# Patient Record
Sex: FEMALE | Race: WHITE | NOT HISPANIC OR LATINO | URBAN - METROPOLITAN AREA
[De-identification: names, ages, dates, MRNs, and addresses within clinical notes are randomized per-mention and may not be internally consistent; named-entity substitution may affect disease eponyms.]

---

## 2018-03-12 ENCOUNTER — INPATIENT (INPATIENT)
Facility: HOSPITAL | Age: 83
LOS: 1 days | Discharge: HOME HEALTH | End: 2018-03-14
Attending: HOSPITALIST

## 2018-03-12 VITALS
TEMPERATURE: 97 F | SYSTOLIC BLOOD PRESSURE: 190 MMHG | HEART RATE: 62 BPM | DIASTOLIC BLOOD PRESSURE: 88 MMHG | RESPIRATION RATE: 18 BRPM

## 2018-03-12 DIAGNOSIS — I10 ESSENTIAL (PRIMARY) HYPERTENSION: ICD-10-CM

## 2018-03-12 DIAGNOSIS — G30.9 ALZHEIMER'S DISEASE, UNSPECIFIED: ICD-10-CM
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
## 2018-03-12 DIAGNOSIS — F02.80 DEMENTIA IN OTHER DISEASES CLASSIFIED ELSEWHERE, UNSPECIFIED SEVERITY, WITHOUT BEHAVIORAL DISTURBANCE, PSYCHOTIC DISTURBANCE, MOOD DISTURBANCE, AND ANXIETY: ICD-10-CM

## 2018-03-12 DIAGNOSIS — E78.00 PURE HYPERCHOLESTEROLEMIA, UNSPECIFIED: ICD-10-CM

## 2018-03-12 LAB
ALBUMIN SERPL ELPH-MCNC: 3.4 G/DL — SIGNIFICANT CHANGE UP (ref 3–5.5)
ALP SERPL-CCNC: 53 U/L — SIGNIFICANT CHANGE UP (ref 30–115)
ALT FLD-CCNC: 18 U/L — SIGNIFICANT CHANGE UP (ref 0–41)
ANION GAP SERPL CALC-SCNC: 6 MMOL/L — LOW (ref 7–14)
APPEARANCE UR: (no result)
AST SERPL-CCNC: 31 U/L — SIGNIFICANT CHANGE UP (ref 0–41)
BASOPHILS # BLD AUTO: 0.02 K/UL — SIGNIFICANT CHANGE UP (ref 0–0.2)
BASOPHILS NFR BLD AUTO: 0.2 % — SIGNIFICANT CHANGE UP (ref 0–1)
BILIRUB SERPL-MCNC: 0.7 MG/DL — SIGNIFICANT CHANGE UP (ref 0.2–1.2)
BILIRUB UR-MCNC: NEGATIVE — SIGNIFICANT CHANGE UP
BUN SERPL-MCNC: 18 MG/DL — SIGNIFICANT CHANGE UP (ref 10–20)
CALCIUM SERPL-MCNC: 9.1 MG/DL — SIGNIFICANT CHANGE UP (ref 8.5–10.1)
CHLORIDE SERPL-SCNC: 106 MMOL/L — SIGNIFICANT CHANGE UP (ref 98–110)
CO2 SERPL-SCNC: 29 MMOL/L — SIGNIFICANT CHANGE UP (ref 17–32)
COLOR SPEC: YELLOW — SIGNIFICANT CHANGE UP
CREAT SERPL-MCNC: 0.9 MG/DL — SIGNIFICANT CHANGE UP (ref 0.7–1.5)
DIFF PNL FLD: (no result)
EOSINOPHIL # BLD AUTO: 0.06 K/UL — SIGNIFICANT CHANGE UP (ref 0–0.7)
EOSINOPHIL NFR BLD AUTO: 0.7 % — SIGNIFICANT CHANGE UP (ref 0–8)
GLUCOSE SERPL-MCNC: 106 MG/DL — SIGNIFICANT CHANGE UP (ref 70–110)
GLUCOSE UR QL: NEGATIVE MG/DL — SIGNIFICANT CHANGE UP
HCT VFR BLD CALC: 36 % — LOW (ref 37–47)
HGB BLD-MCNC: 12.2 G/DL — SIGNIFICANT CHANGE UP (ref 12–16)
IMM GRANULOCYTES NFR BLD AUTO: 0.4 % — HIGH (ref 0.1–0.3)
KETONES UR-MCNC: NEGATIVE — SIGNIFICANT CHANGE UP
LEUKOCYTE ESTERASE UR-ACNC: (no result)
LYMPHOCYTES # BLD AUTO: 1.26 K/UL — SIGNIFICANT CHANGE UP (ref 1.2–3.4)
LYMPHOCYTES # BLD AUTO: 14.8 % — LOW (ref 20.5–51.1)
MCHC RBC-ENTMCNC: 30.3 PG — SIGNIFICANT CHANGE UP (ref 27–31)
MCHC RBC-ENTMCNC: 33.9 G/DL — SIGNIFICANT CHANGE UP (ref 32–37)
MCV RBC AUTO: 89.6 FL — SIGNIFICANT CHANGE UP (ref 81–99)
MONOCYTES # BLD AUTO: 0.39 K/UL — SIGNIFICANT CHANGE UP (ref 0.1–0.6)
MONOCYTES NFR BLD AUTO: 4.6 % — SIGNIFICANT CHANGE UP (ref 1.7–9.3)
NEUTROPHILS # BLD AUTO: 6.74 K/UL — HIGH (ref 1.4–6.5)
NEUTROPHILS NFR BLD AUTO: 79.3 % — HIGH (ref 42.2–75.2)
NITRITE UR-MCNC: POSITIVE
PH UR: 7.5 — SIGNIFICANT CHANGE UP (ref 5–8)
PLATELET # BLD AUTO: 200 K/UL — SIGNIFICANT CHANGE UP (ref 130–400)
POTASSIUM SERPL-MCNC: 4.5 MMOL/L — SIGNIFICANT CHANGE UP (ref 3.5–5)
POTASSIUM SERPL-SCNC: 4.5 MMOL/L — SIGNIFICANT CHANGE UP (ref 3.5–5)
PROT SERPL-MCNC: 5.9 G/DL — LOW (ref 6–8)
PROT UR-MCNC: (no result) MG/DL
RBC # BLD: 4.02 M/UL — LOW (ref 4.2–5.4)
RBC # FLD: 14.2 % — SIGNIFICANT CHANGE UP (ref 11.5–14.5)
SODIUM SERPL-SCNC: 141 MMOL/L — SIGNIFICANT CHANGE UP (ref 135–146)
SP GR SPEC: 1.01 — SIGNIFICANT CHANGE UP (ref 1.01–1.03)
UROBILINOGEN FLD QL: 0.2 MG/DL — SIGNIFICANT CHANGE UP (ref 0.2–0.2)
WBC # BLD: 8.5 K/UL — SIGNIFICANT CHANGE UP (ref 4.8–10.8)
WBC # FLD AUTO: 8.5 K/UL — SIGNIFICANT CHANGE UP (ref 4.8–10.8)

## 2018-03-12 RX ORDER — ASPIRIN/CALCIUM CARB/MAGNESIUM 324 MG
81 TABLET ORAL DAILY
Qty: 0 | Refills: 0 | Status: DISCONTINUED | OUTPATIENT
Start: 2018-03-12 | End: 2018-03-14

## 2018-03-12 RX ORDER — CLOPIDOGREL BISULFATE 75 MG/1
75 TABLET, FILM COATED ORAL DAILY
Qty: 0 | Refills: 0 | Status: DISCONTINUED | OUTPATIENT
Start: 2018-03-12 | End: 2018-03-14

## 2018-03-12 RX ORDER — METOPROLOL TARTRATE 50 MG
25 TABLET ORAL
Qty: 0 | Refills: 0 | Status: DISCONTINUED | OUTPATIENT
Start: 2018-03-12 | End: 2018-03-14

## 2018-03-12 RX ORDER — FOLIC ACID 0.8 MG
1 TABLET ORAL DAILY
Qty: 0 | Refills: 0 | Status: DISCONTINUED | OUTPATIENT
Start: 2018-03-12 | End: 2018-03-14

## 2018-03-12 RX ORDER — DONEPEZIL HYDROCHLORIDE 10 MG/1
5 TABLET, FILM COATED ORAL AT BEDTIME
Qty: 0 | Refills: 0 | Status: DISCONTINUED | OUTPATIENT
Start: 2018-03-12 | End: 2018-03-14

## 2018-03-12 RX ORDER — FAMOTIDINE 10 MG/ML
20 INJECTION INTRAVENOUS DAILY
Qty: 0 | Refills: 0 | Status: DISCONTINUED | OUTPATIENT
Start: 2018-03-12 | End: 2018-03-14

## 2018-03-12 RX ORDER — SIMVASTATIN 20 MG/1
20 TABLET, FILM COATED ORAL AT BEDTIME
Qty: 0 | Refills: 0 | Status: DISCONTINUED | OUTPATIENT
Start: 2018-03-12 | End: 2018-03-14

## 2018-03-12 RX ORDER — HEPARIN SODIUM 5000 [USP'U]/ML
5000 INJECTION INTRAVENOUS; SUBCUTANEOUS EVERY 8 HOURS
Qty: 0 | Refills: 0 | Status: DISCONTINUED | OUTPATIENT
Start: 2018-03-12 | End: 2018-03-14

## 2018-03-12 RX ADMIN — Medication 81 MILLIGRAM(S): at 18:30

## 2018-03-12 RX ADMIN — CLOPIDOGREL BISULFATE 75 MILLIGRAM(S): 75 TABLET, FILM COATED ORAL at 18:30

## 2018-03-12 RX ADMIN — DONEPEZIL HYDROCHLORIDE 5 MILLIGRAM(S): 10 TABLET, FILM COATED ORAL at 21:58

## 2018-03-12 RX ADMIN — HEPARIN SODIUM 5000 UNIT(S): 5000 INJECTION INTRAVENOUS; SUBCUTANEOUS at 21:58

## 2018-03-12 RX ADMIN — SIMVASTATIN 20 MILLIGRAM(S): 20 TABLET, FILM COATED ORAL at 21:58

## 2018-03-12 RX ADMIN — Medication 1 MILLIGRAM(S): at 18:30

## 2018-03-12 RX ADMIN — FAMOTIDINE 20 MILLIGRAM(S): 10 INJECTION INTRAVENOUS at 18:30

## 2018-03-12 RX ADMIN — Medication 25 MILLIGRAM(S): at 18:30

## 2018-03-12 NOTE — ED PROVIDER NOTE - PHYSICAL EXAMINATION
Alert, NAD, WDWN, well-appearing  PERRL, EOMI, normal pupils, no icterus, normal external ENT, pink/moist membranes  Airway intact, Lungs CTAB, no wheezing or rhonchi, normal resp effort w/o tachypnea, no retractions, speaking full sentences  CVS1S2, RRR, no m/g/r, 2+ pulses b/l, warm/well-perfused  Abdomen soft, nondistended, no tenderness on palpation to all 4 quadrants, no rebound or rigidity, no CVA tenderness  FROM all 4 ext, no bony tenderness or obvious deformities, no midline neck tenderness, pelvis stable  AAOx3, CN 2-12 intact, normal motor, normal sensory, normal gait Alert, NAD, WDWN, well-appearing  PERRL, EOMI, normal pupils, no icterus, normal external ENT, pink/moist membranes  Airway intact, Lungs CTAB, no wheezing or rhonchi, normal resp effort w/o tachypnea, no retractions, speaking full sentences  CVS1S2, RRR, no m/g/r, 2+ pulses b/l, warm/well-perfused  Abdomen soft, nondistended, no tenderness on palpation to all 4 quadrants, no rebound or rigidity, no CVA tenderness  FROM all 4 ext, no bony tenderness or obvious deformities, no midline neck tenderness, pelvis stable  AAOx3, CN 2-12 intact, normal motor, normal sensory, normal gait  psych: pt is calm and appropriate

## 2018-03-12 NOTE — H&P ADULT - NSHPPHYSICALEXAM_GEN_ALL_CORE
Vital Signs Last 24 Hrs  T(C): 36.9 (12 Mar 2018 15:06), Max: 36.9 (12 Mar 2018 15:06)  T(F): 98.4 (12 Mar 2018 15:06), Max: 98.4 (12 Mar 2018 15:06)  HR: 51 (12 Mar 2018 15:06) (51 - 62)  BP: 172/81 (12 Mar 2018 15:06) (172/81 - 190/88)  RR: 18 (12 Mar 2018 15:06) (18 - 18)  SpO2: 99% (12 Mar 2018 15:06) (99% - 99%)    GENERAL APPEARANCE:  alert and cooperative AOx2, and appears to be in no acute distress.  HEENT: poor dentition.   NECK: Neck supple, non-tender without lymphadenopathy, masses or thyromegaly.  CARDIAC: RRR, Normal S1 and S2. No S3, S4 or murmurs  LUNGS: Clear to auscultation without rales, rhonchi or wheezing.  ABDOMEN: Positive bowel sounds. Soft, nondistended, nontender. No guarding or rebound. No HSM.  EXTREMITIES: No edema. Peripheral pulses intact. No varicosities.  SKIN: Skin normal color, texture and turgor with no lesions or eruptions.

## 2018-03-12 NOTE — H&P ADULT - NSHPLABSRESULTS_GEN_ALL_CORE
< from: 12 Lead ECG (03.12.18 @ 12:31) >    Ventricular Rate 58 BPM    Atrial Rate 58 BPM    P-R Interval 186 ms    QRS Duration 84 ms    Q-T Interval 448 ms    QTC Calculation(Bezet) 439 ms    P Axis 66 degrees    R Axis 54 degrees    T Axis 39 degrees    < end of copied text >  NSR  No ST seg or T wave inversion

## 2018-03-12 NOTE — ED PROVIDER NOTE - OBJECTIVE STATEMENT
82 yr old female, presenting from home with a history of a reported fall from standing, denies any head injury or LOC, states her knees hurt, lives with family but does not know who called 911, denies any chest pain, abd pain, N/V/D, HA or dizziness. Currently has no complaints. Not on blood thinners

## 2018-03-12 NOTE — H&P ADULT - ASSESSMENT
81 YO woman with a Hx of HTN, DL and Alzheimer's disease presenting for a fall from the couch to the floor. The patient does not have any injuries from the fall and there is no history of syncope.  The patient is to be admitted for monitoring and evaluation by physical medicine.

## 2018-03-12 NOTE — H&P ADULT - PROBLEM SELECTOR PLAN 1
patient may benefit from either long term placement or longer home care since she is not able to care for herself  otherwise stable

## 2018-03-12 NOTE — ED ADULT NURSE NOTE - OBJECTIVE STATEMENT
Pt presents to ED from home c/o a fall. Pt reports she tripped, fell forward onto her knees, & is c/o b/l leg pain. No bruising/abrasions noted. Pt states she cannot remember if she hit her head.

## 2018-03-12 NOTE — ED PROVIDER NOTE - PROGRESS NOTE DETAILS
ACR report states [place] lives in [place], found on the floor, fall from couch while watching TV, unknown who called 911 Spoke to granddaughter Edith Ivan [phone], states she called 911, no one able to take care of her at home, her son is her primary caretaker who is admitted in the hospital for a fall, Alexander Barrientos, [phone]. Not coming to pick her up, no one else able to pick her up. No one else at home to open the door for her. Granddaughter live in [place]. ATTENDING NOTE:   83 y/o F, very poor historian, per ACR and conversation with family, pt had slip off of couch today with no LOC, however she could not get off of floor. Son is primary caregiver but is currently admitted to hospital himself.  Exam as documented and unrevealing.  Will get labs, CXR, EKG, head CT and admit as pt has unsafe discharge.

## 2018-03-12 NOTE — ED PROVIDER NOTE - NS ED ROS FT
Review of Systems:  	•	CONSTITUTIONAL - no fever, no diaphoresis, no chills  	•	SKIN - no rash  	•	RESPIRATORY - no shortness of breath, no cough  	•	CARDIAC - no chest pain, no palpitations  	•	GI - no abd pain, no nausea, no vomiting, no diarrhea  	•	GENITO-URINARY - no discharge, no dysuria; no hematuria, no increased urinary frequency  	•	MUSCULOSKELETAL - no joint paint, no swelling, no redness  	•	NEUROLOGIC - no weakness, no headache, no paresthesias, no LOC  	•	PSYCH - non suicidal, non homicidal, no hallucination, no depression

## 2018-03-12 NOTE — H&P ADULT - ATTENDING COMMENTS
Patient was evaluated and examined by bedside, no active complains, tolerating diet well.    All labs, radiology studies, VS was reviewed  I agree with medical plan outlined by Medical resident as stated above.  will plan for PT/OT evaluation, for chronic medical conditions will resume home regimen tx.

## 2018-03-12 NOTE — H&P ADULT - HISTORY OF PRESENT ILLNESS
This is the case of a 83 YO woman brought to the ED by EMS after she was found on the floor by her granddaughter.  The patient slipped from the couch to the floor while she was sleeping, there is no clear history of loss of consciousness or head trauma and the patient was found awake by her granddaughter. According to her son the patient is not very active and usually only goes from her couch, where she sleeps most of the time to the commode. There is no change in mental status. No recent history of fevers or chills.   At baseline the patient is aox2 and confused most of the time, her primary caregiver is her son, who is currently admitted to the hospital and there is noone at home to take care of her from her family. She has a home aid for 45 hours a week.

## 2018-03-12 NOTE — H&P ADULT - PMH
Alzheimer's dementia without behavioral disturbance, unspecified timing of dementia onset    High cholesterol    Hypertension    Hypothyroid

## 2018-03-13 RX ORDER — NIFEDIPINE 30 MG
10 TABLET, EXTENDED RELEASE 24 HR ORAL ONCE
Qty: 0 | Refills: 0 | Status: COMPLETED | OUTPATIENT
Start: 2018-03-13 | End: 2018-03-13

## 2018-03-13 RX ADMIN — HEPARIN SODIUM 5000 UNIT(S): 5000 INJECTION INTRAVENOUS; SUBCUTANEOUS at 21:04

## 2018-03-13 RX ADMIN — Medication 25 MILLIGRAM(S): at 06:40

## 2018-03-13 RX ADMIN — DONEPEZIL HYDROCHLORIDE 5 MILLIGRAM(S): 10 TABLET, FILM COATED ORAL at 21:04

## 2018-03-13 RX ADMIN — Medication 1 DROP(S): at 18:44

## 2018-03-13 RX ADMIN — SIMVASTATIN 20 MILLIGRAM(S): 20 TABLET, FILM COATED ORAL at 21:04

## 2018-03-13 RX ADMIN — FAMOTIDINE 20 MILLIGRAM(S): 10 INJECTION INTRAVENOUS at 11:27

## 2018-03-13 RX ADMIN — Medication 81 MILLIGRAM(S): at 11:27

## 2018-03-13 RX ADMIN — Medication 1 DROP(S): at 21:04

## 2018-03-13 RX ADMIN — Medication 1 MILLIGRAM(S): at 11:27

## 2018-03-13 RX ADMIN — CLOPIDOGREL BISULFATE 75 MILLIGRAM(S): 75 TABLET, FILM COATED ORAL at 11:27

## 2018-03-13 RX ADMIN — HEPARIN SODIUM 5000 UNIT(S): 5000 INJECTION INTRAVENOUS; SUBCUTANEOUS at 14:45

## 2018-03-13 RX ADMIN — Medication 25 MILLIGRAM(S): at 18:45

## 2018-03-13 RX ADMIN — Medication 10 MILLIGRAM(S): at 02:11

## 2018-03-13 NOTE — DISCHARGE NOTE ADULT - HOSPITAL COURSE
81 yo F w/ PMHx of HTN, DL and Alzheimer's disease presenting for a fall from the couch to the floor. The patient does not have any injuries from the fall and there is no history of syncope. Her trauma workup was negative including CT of head. Her hospital course was unremarkable and PT was consulted. They recommended home with outpatient PT services. She was medically stable for discharge.

## 2018-03-13 NOTE — DISCHARGE NOTE ADULT - CARE PROVIDER_API CALL
Mynor Valles  [address] [facility]  Internal Medicine  Phone: [phone]  Fax: (   )    -

## 2018-03-13 NOTE — DISCHARGE NOTE ADULT - PLAN OF CARE
To ensure you do not have any further falls and your weakness resolves. Please walk with assistance at all times and continue to work with PT to work on your muscle strength. To ensure your mental status remains at baseline. Please continue to take your medications as prescribed and follow up with your PMD.

## 2018-03-13 NOTE — DISCHARGE NOTE ADULT - CARE PLAN
Principal Discharge DX:	Fall at home, initial encounter  Goal:	To ensure you do not have any further falls and your weakness resolves.  Assessment and plan of treatment:	Please walk with assistance at all times and continue to work with PT to work on your muscle strength.  Secondary Diagnosis:	Alzheimer's dementia without behavioral disturbance, unspecified timing of dementia onset  Goal:	To ensure your mental status remains at baseline.  Assessment and plan of treatment:	Please continue to take your medications as prescribed and follow up with your PMD.

## 2018-03-13 NOTE — CONSULT NOTE ADULT - ASSESSMENT

## 2018-03-13 NOTE — DISCHARGE NOTE ADULT - MEDICATION SUMMARY - MEDICATIONS TO TAKE
I will START or STAY ON the medications listed below when I get home from the hospital:    aspirin 81 mg oral tablet, chewable  -- 1 tab(s) by mouth once a day  -- Indication: For TIA    simvastatin 20 mg oral tablet  -- 1 tab(s) by mouth once a day (at bedtime)  -- Indication: For High cholesterol    Plavix 75 mg oral tablet  -- 1 tab(s) by mouth once a day  -- Indication: For TIA    Lopressor  -- 25 milligram(s) by mouth 2 times a day  -- Indication: For Hypertension    donepezil 5 mg oral tablet  -- 1 tab(s) by mouth once a day (at bedtime)  -- Indication: For Alzheimer's dementia without behavioral disturbance, unspecified timing of dementia onset    famotidine 20 mg oral tablet  -- 1 tab(s) by mouth 2 times a day  -- Indication: For GERD    folic acid 1 mg oral tablet  -- 1 tab(s) by mouth once a day  -- Indication: For Alzheimer's dementia without behavioral disturbance, unspecified timing of dementia onset

## 2018-03-13 NOTE — PROGRESS NOTE ADULT - ASSESSMENT
83 yo F w/ PMHx of HTN, DL and Alzheimer's disease presenting for a fall from the couch to the floor. The patient does not have any injuries from the fall and there is no history of syncope.  She was admitted for monitoring and evaluation by physical medicine, her problems are being managed as follows --     #Fall at home likely 2/2 muscle deconditioning and worsening Alzheimer's dementia   - PT rehab c/s  - OOB as tolerated w/ assistance  - c/w donepezil    #HTN, stable  - c/w lopressor    #CAD   - c/w ASA/plavix     #DLD  - c/w simvastatin    #DVT ppx: WYATT    #Dispo: pending PT recs for placement

## 2018-03-13 NOTE — DISCHARGE NOTE ADULT - PATIENT PORTAL LINK FT
You can access the [facility] Patient Portal, offered by [facility], by registering with the following website: [email or web address]

## 2018-03-13 NOTE — DISCHARGE NOTE ADULT - MEDICATION SUMMARY - MEDICATIONS TO STOP TAKING
Ok to take aspirin as long as she doesn't have any history of allergy or major bleeding problems.   I will STOP taking the medications listed below when I get home from the hospital:  None

## 2018-03-13 NOTE — DISCHARGE NOTE ADULT - PROVIDER TOKENS
FREE:[LAST:[Hai],FIRST:[Mynor],PHONE:[phone]],FAX:[(   [phone]    -],ADDRESS:[[address] [place] [address]  Internal Medicine]]

## 2018-03-13 NOTE — DISCHARGE NOTE ADULT - ADDITIONAL INSTRUCTIONS
If you experience any fevers, chills, chest pain, shortness of breath, falls at home please call 911 or go to the nearest ER.

## 2018-03-13 NOTE — PROGRESS NOTE ADULT - ATTENDING COMMENTS
Patient was evaluated and examined by bedside, no active complains, tolerating diet well.    All labs, radiology studies, VS was reviewed  I agree with medical plan outlined by Medical resident as stated above.  patient was evaluated by PT/OT - recommending home health services with home PT.  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  -Chronic left eye- lower eyelid eversion- outpatient opthalmology f/up  D/C PLAN- home with home health services today

## 2018-03-13 NOTE — PROGRESS NOTE ADULT - SUBJECTIVE AND OBJECTIVE BOX
JATINDER.    Patient is a 82y old  Female who presents with a chief complaint of fall (12 Mar 2018 16:38)      PAST MEDICAL & SURGICAL HISTORY:  Alzheimer's dementia without behavioral disturbance, unspecified timing of dementia onset  Hypothyroid  High cholesterol  Hypertension  No significant past surgical history      MEDICATIONS  (STANDING):  artificial  tears Solution 1 Drop(s) Left EYE three times a day  aspirin  chewable 81 milliGRAM(s) Oral daily  clopidogrel Tablet 75 milliGRAM(s) Oral daily  donepezil 5 milliGRAM(s) Oral at bedtime  famotidine    Tablet 20 milliGRAM(s) Oral daily  folic acid 1 milliGRAM(s) Oral daily  heparin  Injectable 5000 Unit(s) SubCutaneous every 8 hours  metoprolol     tartrate 25 milliGRAM(s) Oral two times a day  simvastatin 20 milliGRAM(s) Oral at bedtime      Vital Signs Last 24 Hrs  T(C): 36.1 (13 Mar 2018 05:58), Max: 37 (12 Mar 2018 19:50)  T(F): 97 (13 Mar 2018 05:58), Max: 98.6 (12 Mar 2018 19:50)  HR: 65 (13 Mar 2018 05:58) (50 - 65)  BP: 136/65 (13 Mar 2018 05:58) (126/60 - 192/84)  BP(mean): --  RR: 18 (13 Mar 2018 05:58) (18 - 18)  SpO2: 98% (13 Mar 2018 02:31) (95% - 99%)  CAPILLARY BLOOD GLUCOSE      Physical Exam:    -     General : Lying in bed in no acute distress  -      HEENT: NCAT  -      Cardiac: RRR  -      Pulm: CTA BL   -      GI: soft NTND  -      Musculoskeletal: nl ROM, no edema/cyanosis  -      Neuro: A&O x3, no focal deficits        Labs:                        12.2   8.50  )-----------( 200      ( 12 Mar 2018 11:09 )             36.0             03-12    141  |  106  |  18  ----------------------------<  106  4.5   |  29  |  0.9    Ca    9.1      12 Mar 2018 11:09    TPro  5.9<L>  /  Alb  3.4  /  TBili  0.7  /  DBili  x   /  AST  31  /  ALT  18  /  AlkPhos  53  03-12    LIVER FUNCTIONS - ( 12 Mar 2018 11:09 )  Alb: 3.4 g/dL / Pro: 5.9 g/dL / ALK PHOS: 53 U/L / ALT: 18 U/L / AST: 31 U/L / GGT: x                         Urinalysis Basic - ( 12 Mar 2018 19:05 )    Color: Yellow / Appearance: Cloudy / S.015 / pH: x  Gluc: x / Ketone: Negative  / Bili: Negative / Urobili: 0.2 mg/dL   Blood: x / Protein: Trace mg/dL / Nitrite: Positive   Leuk Esterase: Moderate / RBC: x / WBC 10-25 /HPF   Sq Epi: x / Non Sq Epi: Occasional /HPF / Bacteria: Moderate /HPF          Imaging:    ECG: JATINDER.    Patient is a 82y old  Female who presents with a chief complaint of fall (12 Mar 2018 16:38)      PAST MEDICAL & SURGICAL HISTORY:  Alzheimer's dementia without behavioral disturbance, unspecified timing of dementia onset  Hypothyroid  High cholesterol  Hypertension  No significant past surgical history      MEDICATIONS  (STANDING):  artificial  tears Solution 1 Drop(s) Left EYE three times a day  aspirin  chewable 81 milliGRAM(s) Oral daily  clopidogrel Tablet 75 milliGRAM(s) Oral daily  donepezil 5 milliGRAM(s) Oral at bedtime  famotidine    Tablet 20 milliGRAM(s) Oral daily  folic acid 1 milliGRAM(s) Oral daily  heparin  Injectable 5000 Unit(s) SubCutaneous every 8 hours  metoprolol     tartrate 25 milliGRAM(s) Oral two times a day  simvastatin 20 milliGRAM(s) Oral at bedtime      Vital Signs Last 24 Hrs  T(C): 36.1 (13 Mar 2018 05:58), Max: 37 (12 Mar 2018 19:50)  T(F): 97 (13 Mar 2018 05:58), Max: 98.6 (12 Mar 2018 19:50)  HR: 65 (13 Mar 2018 05:58) (50 - 65)  BP: 136/65 (13 Mar 2018 05:58) (126/60 - 192/84)  BP(mean): --  RR: 18 (13 Mar 2018 05:58) (18 - 18)  SpO2: 98% (13 Mar 2018 02:31) (95% - 99%)  CAPILLARY BLOOD GLUCOSE      Physical Exam:    -     General : Lying in bed in no acute distress  -      HEENT: NCAT  -      Cardiac: RRR  -      Pulm: CTA BL   -      GI: soft NTND  -      Musculoskeletal: nl ROM, no edema/cyanosis        Labs:                        12.2   8.50  )-----------( 200      ( 12 Mar 2018 11:09 )             36.0   03-12    141  |  106  |  18  ----------------------------<  106  4.5   |  29  |  0.9    Ca    9.1      12 Mar 2018 11:09    TPro  5.9<L>  /  Alb  3.4  /  TBili  0.7  /  DBili  x   /  AST  31  /  ALT  18  /  AlkPhos  53  03-12        Urinalysis Basic - ( 12 Mar 2018 19:05 )    Color: Yellow / Appearance: Cloudy / S.015 / pH: x  Gluc: x / Ketone: Negative  / Bili: Negative / Urobili: 0.2 mg/dL   Blood: x / Protein: Trace mg/dL / Nitrite: Positive   Leuk Esterase: Moderate / RBC: x / WBC 10-25 /HPF   Sq Epi: x / Non Sq Epi: Occasional /HPF / Bacteria: Moderate /HPF

## 2018-03-13 NOTE — CONSULT NOTE ADULT - SUBJECTIVE AND OBJECTIVE BOX
HPI:  This is the case of a 81 YO woman brought to the ED by EMS after she was found on the floor by her granddaughter.  The patient slipped from the couch to the floor while she was sleeping, there is no clear history of loss of consciousness or head trauma and the patient was found awake by her granddaughter. According to her son the patient is not very active and usually only goes from her couch, where she sleeps most of the time to the commode. There is no change in mental status. No recent history of fevers or chills.   At baseline the patient is aox2 and confused most of the time, her primary caregiver is her son, who is currently admitted to the hospital and there is no one at home to take care of her from her family. She has a home aid for 45 hours a week. (12 Mar 2018 16:38)      PAST MEDICAL & SURGICAL HISTORY:  Alzheimer's dementia without behavioral disturbance, unspecified timing of dementia onset  Hypothyroid  High cholesterol  Hypertension  No significant past surgical history      Hospital Course:    TODAY'S SUBJECTIVE & REVIEW OF SYMPTOMS:     Constitutional WNL   Cardio WNL   Resp WNL   GI WNL  Heme WNL  Endo WNL  Skin WNL  MSK WNL  Neuro WNL      MEDICATIONS  (STANDING):  artificial  tears Solution 1 Drop(s) Left EYE three times a day  aspirin  chewable 81 milliGRAM(s) Oral daily  clopidogrel Tablet 75 milliGRAM(s) Oral daily  donepezil 5 milliGRAM(s) Oral at bedtime  famotidine    Tablet 20 milliGRAM(s) Oral daily  folic acid 1 milliGRAM(s) Oral daily  heparin  Injectable 5000 Unit(s) SubCutaneous every 8 hours  metoprolol     tartrate 25 milliGRAM(s) Oral two times a day  simvastatin 20 milliGRAM(s) Oral at bedtime    MEDICATIONS  (PRN):      FAMILY HISTORY:  No pertinent family history in first degree relatives      Allergies    penicillin (Unknown)    Intolerances        SOCIAL HISTORY:    [  ] Etoh  [  ] Smoking  [  ] Substance abuse     Home Environment:  [  ] Home Alone  [ x ] Lives with Family  [  ] Home Health Aid    Dwelling:  [  ] Apartment  [ x ] Private House  [  ] Adult Home  [  ] Skilled Nursing Facility      [  ] Short Term  [  ] Long Term  [ x ] Stairs       Elevator [  ]    FUNCTIONAL STATUS PTA: (Check all that apply)  Ambulation: [   ]Independent    [  ] Dependent     [  ] Non-Ambulatory  Assistive Device: [  ] SA Cane  [  ]  Q Cane  [ x ] Walker  [  ]  Wheelchair  ADL : [  ] Independent  [  x]  Dependent       Vital Signs Last 24 Hrs  T(C): 36.1 (13 Mar 2018 05:58), Max: 37 (12 Mar 2018 19:50)  T(F): 97 (13 Mar 2018 05:58), Max: 98.6 (12 Mar 2018 19:50)  HR: 65 (13 Mar 2018 05:58) (50 - 65)  BP: 136/65 (13 Mar 2018 05:58) (126/60 - 192/84)  BP(mean): --  RR: 18 (13 Mar 2018 05:58) (18 - 18)  SpO2: 98% (13 Mar 2018 02:31) (95% - 99%)      PHYSICAL EXAM: awake   GENERAL: NAD, well-groomed, well-developed  HEAD:  Atraumatic, Normocephalic  EYES: EOMI, PERRLA, conjunctiva and sclera clear  NECK: Supple, No JVD, Normal thyroid  CHEST/LUNG: Clear   HEART: Regular   ABDOMEN: Soft  EXTREMITIES:  no calf tenderness    NERVOUS SYSTEM:  Cranial Nerves 2-12 intact [  ] Abnormal  [  ]  ROM: WFL all extremities [  ]  Abnormal [  ]  Motor Strength: WFL all extremities  [  ]  Abnormal [  ]  Sensation: intact to light touch [  ] Abnormal [  ]  Reflexes: Symmetric [  ]  Abnormal [  ]    FUNCTIONAL STATUS:  Bed Mobility: Independent [  ]  Supervision [  ]  Needs Assistance [x  ]  N/A [  ]  Transfers: Independent [  ]  Supervision [  ]  Needs Assistance [x  ]  N/A [  ]   Ambulation: Independent [  ]  Supervision [  ]  Needs Assistance [  ]  N/A [  ]  ADL: Independent [  ] Requires Assistance [  ] N/A [  ]      LABS:                        12.2   8.50  )-----------( 200      ( 12 Mar 2018 11:09 )             36.0     03-12    141  |  106  |  18  ----------------------------<  106  4.5   |  29  |  0.9    Ca    9.1      12 Mar 2018 11:09    TPro  5.9<L>  /  Alb  3.4  /  TBili  0.7  /  DBili  x   /  AST  31  /  ALT  18  /  AlkPhos  53  03-12      Urinalysis Basic - ( 12 Mar 2018 19:05 )    Color: Yellow / Appearance: Cloudy / S.015 / pH: x  Gluc: x / Ketone: Negative  / Bili: Negative / Urobili: 0.2 mg/dL   Blood: x / Protein: Trace mg/dL / Nitrite: Positive   Leuk Esterase: Moderate / RBC: x / WBC 10-25 /HPF   Sq Epi: x / Non Sq Epi: Occasional /HPF / Bacteria: Moderate /HPF        RADIOLOGY & ADDITIONAL STUDIES:    Assesment:

## 2018-03-14 VITALS
DIASTOLIC BLOOD PRESSURE: 70 MMHG | TEMPERATURE: 97 F | SYSTOLIC BLOOD PRESSURE: 154 MMHG | HEART RATE: 59 BPM | RESPIRATION RATE: 19 BRPM

## 2018-03-14 RX ADMIN — HEPARIN SODIUM 5000 UNIT(S): 5000 INJECTION INTRAVENOUS; SUBCUTANEOUS at 05:24

## 2018-03-14 RX ADMIN — Medication 1 DROP(S): at 14:12

## 2018-03-14 RX ADMIN — Medication 1 MILLIGRAM(S): at 11:22

## 2018-03-14 RX ADMIN — HEPARIN SODIUM 5000 UNIT(S): 5000 INJECTION INTRAVENOUS; SUBCUTANEOUS at 14:12

## 2018-03-14 RX ADMIN — Medication 81 MILLIGRAM(S): at 11:22

## 2018-03-14 RX ADMIN — Medication 25 MILLIGRAM(S): at 05:24

## 2018-03-14 RX ADMIN — FAMOTIDINE 20 MILLIGRAM(S): 10 INJECTION INTRAVENOUS at 11:22

## 2018-03-14 RX ADMIN — Medication 1 DROP(S): at 05:24

## 2018-03-14 RX ADMIN — CLOPIDOGREL BISULFATE 75 MILLIGRAM(S): 75 TABLET, FILM COATED ORAL at 11:22

## 2018-03-15 DIAGNOSIS — I10 ESSENTIAL (PRIMARY) HYPERTENSION: ICD-10-CM

## 2018-03-15 DIAGNOSIS — R26.9 UNSPECIFIED ABNORMALITIES OF GAIT AND MOBILITY: ICD-10-CM

## 2018-03-15 DIAGNOSIS — E78.5 HYPERLIPIDEMIA, UNSPECIFIED: ICD-10-CM
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
## 2018-03-15 DIAGNOSIS — F02.80 DEMENTIA IN OTHER DISEASES CLASSIFIED ELSEWHERE, UNSPECIFIED SEVERITY, WITHOUT BEHAVIORAL DISTURBANCE, PSYCHOTIC DISTURBANCE, MOOD DISTURBANCE, AND ANXIETY: ICD-10-CM
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
## 2018-03-15 DIAGNOSIS — E03.9 HYPOTHYROIDISM, UNSPECIFIED: ICD-10-CM
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
## 2018-03-15 DIAGNOSIS — Z88.0 ALLERGY STATUS TO PENICILLIN: ICD-10-CM

## 2018-03-15 DIAGNOSIS — M62.81 MUSCLE WEAKNESS (GENERALIZED): ICD-10-CM

## 2018-03-15 DIAGNOSIS — G30.9 ALZHEIMER'S DISEASE, UNSPECIFIED: ICD-10-CM

## 2020-09-30 NOTE — ED ADULT NURSE NOTE - IN THE PAST YEAR, HOW OFTEN HAVE YOU USED TOBACCO PRODUCTS?
>>prior visits  The patient is a 19 year old,  or  male , who presents on referral from ER, for a gastroenterology evaluation for diarrhea which has been watery, and he has been having about 6 bowel movements per day when the symptoms occur. A copy of this document will be sent to the referring provider. The diarrhea has been present daily since it began approximately 1 month ago. Besides diarrhea , the patient complains of abdominal pain and hematochezia. They have had hematochezia for 1 week. When there is blood, the color is bright red, and the bleeding is intermittently. He reports abdominal pain which is dull and aching in nature. It has been present for approximately 1 month. The pain is located in the lower quadrants. When the pain occurs it is mild in severity, and has a crampy quality. The pain seems to occur at random times. The pain is improved with nothing in particular. He has not traveled out of the country. The patient states no additional potential causes. Diagnostic Studies: Diagnostic Studies labwork and CT done recently at hospital PMH-NONE PSH-TESTICULAR SURGERY ALL-NKDA SH-DENIES TOBACCO ETOH OR DRUG USE FH-DENIES IBD SYMPTOMS   10/4/18: Patient returns for follow up. 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 A prescription for budesonide was sent to [facility] pharmacy, however patient's father states it was never mailed to them. Patient has been doing well. He does not have any further diarrhea or bloody stools. No abdominal pain, N/V.   FOLLOW UP 1/29/19-PATIENT PRESENTS FOR FOLLOW UP. HE RECENTLY HAD STOOL TESTNING WHICH WAS NEGATIVE FOR INFECTION OR INFLAMMATION. HE COMPLAINS OF BLOOD ON TOILET TISSUE WHEN HE WIPES. HE DOES STRAIN SOME WHEN GOING TO BATHROOM. HE HAS HAD A RECENT RECTAL EXAM.   FOLLOW UP 3/26/19-PATIENT PRESENTS FOR FOLLOW UP. HE STATES HE HAD ANOTHER EPISODE OF HEMORRHOID BLEEDING RECENTLY. HE HAS BEEN INCONSISTENTLY TAKING BENEFIBER AND DID NOT TAKE ANUSOL CREAM FROM LAST VISIT. HE STATES HE IS HAVING 2 BOWEL MOVEMENTS DAILY WHICH ARE SOFT. HE DENIES ANY NEW SYMPTOMS.   FOLLOW UP 5/21/19-PATIENT PRESENTS FOR FOLLOW UP. HE IS NOT TAKING BENEFIBER BUT DID USE ANUSOL CREAM WHICH STOPPED HIS HEMORRHOIDAL DISCOMFORT. HE DENIES CURRENT BLEEDING. 8/20/19: Patient returns with c/o recurrent rectal bleeding x 1 episode on 8/7/19. He reports a large amount of blood in his underwear. Denies any further bleeding since then. No rectal pain. He admits to straning with BMs. Stool is occasionally hard. Denies any rectal pain. Reports anal itching from hemorrhoids. Had been using Anusol-HC, but is out. He has been taking Benefiber morning and night. He has not tried any other laxatives or stool softeners. Denies any abdominal pain, N/V.   FOLLOW UP 10/29/19-PATIENT PRESENTS FOR FOLLOW UP. HE IS TAKING BENEFIBER WHICH IS HELPING HIS CONSTIPATION AND STRAINING. Never

## 2021-03-11 ENCOUNTER — INPATIENT (INPATIENT)
Facility: HOSPITAL | Age: 86
LOS: 3 days | Discharge: HOME | End: 2021-03-15
Attending: FAMILY MEDICINE | Admitting: FAMILY MEDICINE
Payer: MEDICARE

## 2021-03-11 VITALS
TEMPERATURE: 98 F | RESPIRATION RATE: 18 BRPM | SYSTOLIC BLOOD PRESSURE: 197 MMHG | DIASTOLIC BLOOD PRESSURE: 82 MMHG | OXYGEN SATURATION: 99 % | HEART RATE: 60 BPM

## 2021-03-11 PROBLEM — E03.9 HYPOTHYROIDISM, UNSPECIFIED: Chronic | Status: ACTIVE | Noted: 2018-03-12

## 2021-03-11 PROBLEM — I10 ESSENTIAL (PRIMARY) HYPERTENSION: Chronic | Status: ACTIVE | Noted: 2018-03-12

## 2021-03-11 PROBLEM — G30.9 ALZHEIMER'S DISEASE, UNSPECIFIED: Chronic | Status: ACTIVE | Noted: 2018-03-12

## 2021-03-11 PROBLEM — E78.00 PURE HYPERCHOLESTEROLEMIA, UNSPECIFIED: Chronic | Status: ACTIVE | Noted: 2018-03-12

## 2021-03-11 LAB
ALBUMIN SERPL ELPH-MCNC: 4.2 G/DL — SIGNIFICANT CHANGE UP (ref 3.5–5.2)
ALP SERPL-CCNC: 86 U/L — SIGNIFICANT CHANGE UP (ref 30–115)
ALT FLD-CCNC: 19 U/L — SIGNIFICANT CHANGE UP (ref 0–41)
ANION GAP SERPL CALC-SCNC: 7 MMOL/L — SIGNIFICANT CHANGE UP (ref 7–14)
APPEARANCE UR: CLEAR — SIGNIFICANT CHANGE UP
APTT BLD: 27.7 SEC — SIGNIFICANT CHANGE UP (ref 27–39.2)
AST SERPL-CCNC: 20 U/L — SIGNIFICANT CHANGE UP (ref 0–41)
BACTERIA # UR AUTO: ABNORMAL
BASOPHILS # BLD AUTO: 0.03 K/UL — SIGNIFICANT CHANGE UP (ref 0–0.2)
BASOPHILS NFR BLD AUTO: 0.6 % — SIGNIFICANT CHANGE UP (ref 0–1)
BILIRUB SERPL-MCNC: 0.5 MG/DL — SIGNIFICANT CHANGE UP (ref 0.2–1.2)
BILIRUB UR-MCNC: NEGATIVE — SIGNIFICANT CHANGE UP
BLD GP AB SCN SERPL QL: SIGNIFICANT CHANGE UP
BUN SERPL-MCNC: 24 MG/DL — HIGH (ref 10–20)
CALCIUM SERPL-MCNC: 9.7 MG/DL — SIGNIFICANT CHANGE UP (ref 8.5–10.1)
CHLORIDE SERPL-SCNC: 103 MMOL/L — SIGNIFICANT CHANGE UP (ref 98–110)
CO2 SERPL-SCNC: 31 MMOL/L — SIGNIFICANT CHANGE UP (ref 17–32)
COLOR SPEC: YELLOW — SIGNIFICANT CHANGE UP
CREAT SERPL-MCNC: 1 MG/DL — SIGNIFICANT CHANGE UP (ref 0.7–1.5)
DIFF PNL FLD: ABNORMAL
EOSINOPHIL # BLD AUTO: 0.1 K/UL — SIGNIFICANT CHANGE UP (ref 0–0.7)
EOSINOPHIL NFR BLD AUTO: 1.9 % — SIGNIFICANT CHANGE UP (ref 0–8)
EPI CELLS # UR: ABNORMAL /HPF
GLUCOSE SERPL-MCNC: 109 MG/DL — HIGH (ref 70–99)
GLUCOSE UR QL: NEGATIVE MG/DL — SIGNIFICANT CHANGE UP
HCT VFR BLD CALC: 40.2 % — SIGNIFICANT CHANGE UP (ref 37–47)
HGB BLD-MCNC: 13.4 G/DL — SIGNIFICANT CHANGE UP (ref 12–16)
IMM GRANULOCYTES NFR BLD AUTO: 0.6 % — HIGH (ref 0.1–0.3)
INR BLD: 0.97 RATIO — SIGNIFICANT CHANGE UP (ref 0.65–1.3)
KETONES UR-MCNC: NEGATIVE — SIGNIFICANT CHANGE UP
LACTATE SERPL-SCNC: 1 MMOL/L — SIGNIFICANT CHANGE UP (ref 0.7–2)
LEUKOCYTE ESTERASE UR-ACNC: ABNORMAL
LYMPHOCYTES # BLD AUTO: 1.44 K/UL — SIGNIFICANT CHANGE UP (ref 1.2–3.4)
LYMPHOCYTES # BLD AUTO: 28.1 % — SIGNIFICANT CHANGE UP (ref 20.5–51.1)
MCHC RBC-ENTMCNC: 29.9 PG — SIGNIFICANT CHANGE UP (ref 27–31)
MCHC RBC-ENTMCNC: 33.3 G/DL — SIGNIFICANT CHANGE UP (ref 32–37)
MCV RBC AUTO: 89.7 FL — SIGNIFICANT CHANGE UP (ref 81–99)
MONOCYTES # BLD AUTO: 0.29 K/UL — SIGNIFICANT CHANGE UP (ref 0.1–0.6)
MONOCYTES NFR BLD AUTO: 5.7 % — SIGNIFICANT CHANGE UP (ref 1.7–9.3)
NEUTROPHILS # BLD AUTO: 3.24 K/UL — SIGNIFICANT CHANGE UP (ref 1.4–6.5)
NEUTROPHILS NFR BLD AUTO: 63.1 % — SIGNIFICANT CHANGE UP (ref 42.2–75.2)
NITRITE UR-MCNC: NEGATIVE — SIGNIFICANT CHANGE UP
NRBC # BLD: 0 /100 WBCS — SIGNIFICANT CHANGE UP (ref 0–0)
NT-PROBNP SERPL-SCNC: 712 PG/ML — HIGH (ref 0–300)
PH UR: 7.5 — SIGNIFICANT CHANGE UP (ref 5–8)
PLATELET # BLD AUTO: 204 K/UL — SIGNIFICANT CHANGE UP (ref 130–400)
POTASSIUM SERPL-MCNC: 4.4 MMOL/L — SIGNIFICANT CHANGE UP (ref 3.5–5)
POTASSIUM SERPL-SCNC: 4.4 MMOL/L — SIGNIFICANT CHANGE UP (ref 3.5–5)
PROT SERPL-MCNC: 7 G/DL — SIGNIFICANT CHANGE UP (ref 6–8)
PROT UR-MCNC: NEGATIVE MG/DL — SIGNIFICANT CHANGE UP
PROTHROM AB SERPL-ACNC: 11.1 SEC — SIGNIFICANT CHANGE UP (ref 9.95–12.87)
RAPID RVP RESULT: SIGNIFICANT CHANGE UP
RBC # BLD: 4.48 M/UL — SIGNIFICANT CHANGE UP (ref 4.2–5.4)
RBC # FLD: 14.3 % — SIGNIFICANT CHANGE UP (ref 11.5–14.5)
RBC CASTS # UR COMP ASSIST: SIGNIFICANT CHANGE UP /HPF
SARS-COV-2 RNA SPEC QL NAA+PROBE: SIGNIFICANT CHANGE UP
SODIUM SERPL-SCNC: 141 MMOL/L — SIGNIFICANT CHANGE UP (ref 135–146)
SP GR SPEC: 1.02 — SIGNIFICANT CHANGE UP (ref 1.01–1.03)
TROPONIN T SERPL-MCNC: <0.01 NG/ML — SIGNIFICANT CHANGE UP
UROBILINOGEN FLD QL: 0.2 MG/DL — SIGNIFICANT CHANGE UP (ref 0.2–0.2)
WBC # BLD: 5.13 K/UL — SIGNIFICANT CHANGE UP (ref 4.8–10.8)
WBC # FLD AUTO: 5.13 K/UL — SIGNIFICANT CHANGE UP (ref 4.8–10.8)
WBC UR QL: ABNORMAL /HPF

## 2021-03-11 PROCEDURE — 99223 1ST HOSP IP/OBS HIGH 75: CPT

## 2021-03-11 PROCEDURE — 99285 EMERGENCY DEPT VISIT HI MDM: CPT

## 2021-03-11 PROCEDURE — 99233 SBSQ HOSP IP/OBS HIGH 50: CPT

## 2021-03-11 PROCEDURE — 74174 CTA ABD&PLVS W/CONTRAST: CPT | Mod: 26

## 2021-03-11 PROCEDURE — 71045 X-RAY EXAM CHEST 1 VIEW: CPT | Mod: 26

## 2021-03-11 RX ORDER — METOPROLOL TARTRATE 50 MG
25 TABLET ORAL
Qty: 0 | Refills: 0 | DISCHARGE

## 2021-03-11 RX ORDER — SODIUM CHLORIDE 9 MG/ML
3 INJECTION INTRAMUSCULAR; INTRAVENOUS; SUBCUTANEOUS EVERY 8 HOURS
Refills: 0 | Status: DISCONTINUED | OUTPATIENT
Start: 2021-03-11 | End: 2021-03-15

## 2021-03-11 RX ORDER — AZITHROMYCIN 500 MG/1
0 TABLET, FILM COATED ORAL
Qty: 0 | Refills: 0 | DISCHARGE

## 2021-03-11 RX ORDER — PANTOPRAZOLE SODIUM 20 MG/1
40 TABLET, DELAYED RELEASE ORAL
Refills: 0 | Status: DISCONTINUED | OUTPATIENT
Start: 2021-03-11 | End: 2021-03-15

## 2021-03-11 RX ORDER — CHLORHEXIDINE GLUCONATE 213 G/1000ML
1 SOLUTION TOPICAL
Refills: 0 | Status: DISCONTINUED | OUTPATIENT
Start: 2021-03-11 | End: 2021-03-15

## 2021-03-11 RX ORDER — SIMVASTATIN 20 MG/1
1 TABLET, FILM COATED ORAL
Qty: 0 | Refills: 0 | DISCHARGE

## 2021-03-11 RX ORDER — DONEPEZIL HYDROCHLORIDE 10 MG/1
5 TABLET, FILM COATED ORAL AT BEDTIME
Refills: 0 | Status: DISCONTINUED | OUTPATIENT
Start: 2021-03-11 | End: 2021-03-15

## 2021-03-11 RX ORDER — CEFTRIAXONE 500 MG/1
1000 INJECTION, POWDER, FOR SOLUTION INTRAMUSCULAR; INTRAVENOUS EVERY 24 HOURS
Refills: 0 | Status: COMPLETED | OUTPATIENT
Start: 2021-03-11 | End: 2021-03-14

## 2021-03-11 RX ORDER — CEFTRIAXONE 500 MG/1
1000 INJECTION, POWDER, FOR SOLUTION INTRAMUSCULAR; INTRAVENOUS ONCE
Refills: 0 | Status: COMPLETED | OUTPATIENT
Start: 2021-03-11 | End: 2021-03-11

## 2021-03-11 RX ORDER — SIMVASTATIN 20 MG/1
20 TABLET, FILM COATED ORAL AT BEDTIME
Refills: 0 | Status: DISCONTINUED | OUTPATIENT
Start: 2021-03-11 | End: 2021-03-15

## 2021-03-11 RX ORDER — ONDANSETRON 8 MG/1
4 TABLET, FILM COATED ORAL EVERY 8 HOURS
Refills: 0 | Status: DISCONTINUED | OUTPATIENT
Start: 2021-03-11 | End: 2021-03-15

## 2021-03-11 RX ORDER — FOLIC ACID 0.8 MG
1 TABLET ORAL
Qty: 0 | Refills: 0 | DISCHARGE

## 2021-03-11 RX ORDER — FUROSEMIDE 40 MG
0 TABLET ORAL
Qty: 0 | Refills: 0 | DISCHARGE

## 2021-03-11 RX ORDER — DONEPEZIL HYDROCHLORIDE 10 MG/1
1 TABLET, FILM COATED ORAL
Qty: 0 | Refills: 0 | DISCHARGE

## 2021-03-11 RX ORDER — PANTOPRAZOLE SODIUM 20 MG/1
8 TABLET, DELAYED RELEASE ORAL
Qty: 80 | Refills: 0 | Status: DISCONTINUED | OUTPATIENT
Start: 2021-03-11 | End: 2021-03-11

## 2021-03-11 RX ORDER — SODIUM CHLORIDE 9 MG/ML
1000 INJECTION, SOLUTION INTRAVENOUS ONCE
Refills: 0 | Status: COMPLETED | OUTPATIENT
Start: 2021-03-11 | End: 2021-03-11

## 2021-03-11 RX ORDER — CLOPIDOGREL BISULFATE 75 MG/1
1 TABLET, FILM COATED ORAL
Qty: 0 | Refills: 0 | DISCHARGE

## 2021-03-11 RX ORDER — PANTOPRAZOLE SODIUM 20 MG/1
80 TABLET, DELAYED RELEASE ORAL ONCE
Refills: 0 | Status: COMPLETED | OUTPATIENT
Start: 2021-03-11 | End: 2021-03-11

## 2021-03-11 RX ORDER — HYDROCORTISONE 20 MG
0 TABLET ORAL
Qty: 0 | Refills: 0 | DISCHARGE

## 2021-03-11 RX ORDER — METOPROLOL TARTRATE 50 MG
25 TABLET ORAL DAILY
Refills: 0 | Status: DISCONTINUED | OUTPATIENT
Start: 2021-03-11 | End: 2021-03-15

## 2021-03-11 RX ORDER — ACETAMINOPHEN 500 MG
650 TABLET ORAL EVERY 6 HOURS
Refills: 0 | Status: DISCONTINUED | OUTPATIENT
Start: 2021-03-11 | End: 2021-03-15

## 2021-03-11 RX ORDER — ASPIRIN/CALCIUM CARB/MAGNESIUM 324 MG
1 TABLET ORAL
Qty: 0 | Refills: 0 | DISCHARGE

## 2021-03-11 RX ORDER — FOLIC ACID 0.8 MG
1 TABLET ORAL DAILY
Refills: 0 | Status: DISCONTINUED | OUTPATIENT
Start: 2021-03-11 | End: 2021-03-15

## 2021-03-11 RX ADMIN — PANTOPRAZOLE SODIUM 10 MG/HR: 20 TABLET, DELAYED RELEASE ORAL at 12:13

## 2021-03-11 RX ADMIN — PANTOPRAZOLE SODIUM 80 MILLIGRAM(S): 20 TABLET, DELAYED RELEASE ORAL at 11:58

## 2021-03-11 RX ADMIN — SIMVASTATIN 20 MILLIGRAM(S): 20 TABLET, FILM COATED ORAL at 22:42

## 2021-03-11 RX ADMIN — CEFTRIAXONE 100 MILLIGRAM(S): 500 INJECTION, POWDER, FOR SOLUTION INTRAMUSCULAR; INTRAVENOUS at 14:39

## 2021-03-11 RX ADMIN — SODIUM CHLORIDE 1000 MILLILITER(S): 9 INJECTION, SOLUTION INTRAVENOUS at 10:27

## 2021-03-11 RX ADMIN — PANTOPRAZOLE SODIUM 40 MILLIGRAM(S): 20 TABLET, DELAYED RELEASE ORAL at 17:35

## 2021-03-11 RX ADMIN — SODIUM CHLORIDE 3 MILLILITER(S): 9 INJECTION INTRAMUSCULAR; INTRAVENOUS; SUBCUTANEOUS at 22:40

## 2021-03-11 RX ADMIN — DONEPEZIL HYDROCHLORIDE 5 MILLIGRAM(S): 10 TABLET, FILM COATED ORAL at 22:42

## 2021-03-11 NOTE — H&P ADULT - HISTORY OF PRESENT ILLNESS
84 y/o F PMHx dementia, hypothyroidism, HTN, HLD, TIA in 2000 on ASA Plavix presents to ED with rectal bleeding. Patient unable to provide history, son provided history.    No GI cancers in first or second degree relatives  No smoking. No alcohol. No illegal drug use. 84 y/o F PMHx dementia,  HTN, HLD, TIA in 2000 on ASA Plavix presents to ED with rectal bleeding. Patient unable to provide history, son provided history.    As per son , pt had 6 BM yesterday then last episode had Bright Red blood in stool.  last colonoscopy , more than 10 years ago     PMD: Dr. Cook in [place], [place]     No GI cancers in first or second degree relatives  No smoking. No alcohol. No illegal drug use.    er also treated for UTI based on UA

## 2021-03-11 NOTE — H&P ADULT - NSHPPHYSICALEXAM_GEN_ALL_CORE
T(F): 98 (03-11-21 @ 08:55), Max: 98 (03-11-21 @ 08:55)  HR: 71 (03-11-21 @ 12:08) (60 - 71)  BP: 183/81 (03-11-21 @ 12:08) (183/81 - 197/82)  RR: 18 (03-11-21 @ 12:08) (18 - 18)  SpO2: 97% (03-11-21 @ 12:08) (97% - 99%)    PHYSICAL EXAM:  GENERAL: does not respond appropriately to prompts   HEAD:  Atraumatic, Normocephalic  EYES: conjunctiva and sclera clear  NECK: Supple, No thyromegaly   CHEST/LUNG: Clear to auscultation bilaterally; No wheeze, rhonchi, or rales  HEART: Regular rate and rhythm; normal S1, S2, No murmurs.  ABDOMEN: Soft, nontender, nondistended; Bowel sounds present  NEUROLOGY: No asterixis or tremor  SKIN: Intact, no jaundice  Rectal exam-trace red blood in rectal vault T(F): 98 (03-11-21 @ 08:55), Max: 98 (03-11-21 @ 08:55)  HR: 71 (03-11-21 @ 12:08) (60 - 71)  BP: 183/81 (03-11-21 @ 12:08) (183/81 - 197/82)  RR: 18 (03-11-21 @ 12:08) (18 - 18)  SpO2: 97% (03-11-21 @ 12:08) (97% - 99%)    PHYSICAL EXAM:  GENERAL: does not respond appropriately to prompts   HEAD:  Atraumatic, Normocephalic  EYES: conjunctiva and sclera clear  NECK: Supple, No thyromegaly   CHEST/LUNG: Clear to auscultation bilaterally; No wheeze, rhonchi, or rales  HEART: Regular rate and rhythm; normal S1, S2, No murmurs.  ABDOMEN: Soft, nontender, nondistended; Bowel sounds present  NEUROLOGY: No asterixis or tremor  SKIN: Intact, no jaundice  Rectal exam-trace red blood in rectal vault- done by ER staff

## 2021-03-11 NOTE — ED PROVIDER NOTE - PHYSICAL EXAMINATION
CONSTITUTIONAL: Well-developed; well-nourished; in no acute distress.   SKIN: warm, dry  HEAD: Normocephalic; atraumatic.  EYES: no conjunctival injection. EOMI.   ENT: No nasal discharge; airway clear.  NECK: Supple; non tender.  CARD: S1, S2 normal; Regular rate and rhythm.   RESP: No wheezes, rales or rhonchi.    ABD: soft ntnd. No CVA TTP.   EXT: Normal ROM.  No LE edema.   LYMPH: No acute cervical adenopathy.  NEURO: AAO X1. No FND.   PSYCH: Cooperative, appropriate. CONSTITUTIONAL: Well-developed; well-nourished; in no acute distress.   SKIN: warm, dry  HEAD: Normocephalic; atraumatic.  EYES: no conjunctival injection. EOMI.   ENT: No nasal discharge; airway clear. Dry MM.   NECK: Supple; non tender.  CARD: S1, S2 normal; Regular rate and rhythm.   RESP: No wheezes, rales or rhonchi.    ABD: soft ntnd. No CVA TTP.   EXT: Normal ROM.  No LE edema.   LYMPH: No acute cervical adenopathy.  NEURO: AAO X1. No FND.   PSYCH: Cooperative, appropriate.

## 2021-03-11 NOTE — H&P ADULT - ASSESSMENT
85yFemale pmh HTN, high cholesterol, alzheimer's presents, TIA on aspirin and plavix presents for 5 episodes of rectal bleeding bright red blood since yesterday    Problem 1-Rectal bleeding  ddx hemorrhoidal, diverticular, malignancy   patient has never had an EGD/Colonoscopy before   Rec  -CT angio if positive IR consult for embolization if negative colonoscopy in 5 days if okay to hold plavix after neuro evaluation   -Clear liquids no red dye  -Neurology consult to see if plavix can be held  -Maintain Hemodynamic Stability   -Monitor CBC  -CMP,Optimize Electrolytes  -PT,PTT,INR  -EKG, Chest-Xray   -Transfuse prn to hgb >8  -Two large bore IV lines  - PPI BID  -Monitor Vital Signs  -Monitor Stool For blood, frequency, consistency, melena  -Active Type and Screen 85yFemale pmh HTN, high cholesterol, alzheimer's presents, TIA on aspirin and plavix presents for episodes of rectal bleeding bright red blood since yesterday    # Rectal bleeding poss due to  hemorrhoidal, diverticular,  or malignancy   - colonoscopy in 5 days if okay to hold plavix as per GI  -Clear liquids no red dye  -Maintain Hemodynamic Stability   -Monitor CBC  -Two large bore IV lines  -PPI BID  -Monitor Vital Signs  -Monitor Stool For blood, frequency, consistency, melena  -Active Type and Screen    # UTI,   on CT ascending UTI, no fever or increase wbc , UA neg nit   - will d/c cadet   - send for urine cx   - c/w Rocephin for now     # HTN   - c/w metoprolol 25 mg qd  - low NA diet     # H/O TIA ?2000  - on asa and plavix , will hold both as no indication is seen     # HLD  - c/w statin     # alzheimer  - c/w home meds       hold VTE meds , will place b/l seq    d/w Dr. Sibley

## 2021-03-11 NOTE — ED PROVIDER NOTE - OBJECTIVE STATEMENT
84 y/o F PMHx dementia, hypothyroidism, HTN, HLD, TIA in 2000 on ASA Plavix presents to ED with rectal bleeding. Patient unable to provide history, repeatedly stating "I had a stroke." 84 y/o F PMHx dementia, hypothyroidism, HTN, HLD, TIA in 2000 on ASA Plavix presents to ED with rectal bleeding. Patient unable to provide history, son provided history.

## 2021-03-11 NOTE — PHARMACOTHERAPY INTERVENTION NOTE - NSPHARMCOMMMEDSAFE
Allergy reviewed/ verified - Prescriber aware; Therapy continued
Allergy reviewed/ verified - Prescriber aware; Therapy continued

## 2021-03-11 NOTE — ED PROVIDER NOTE - CARE PLAN
Assessment and plan of treatment:	Plan: EKG, CXR, labs, urine, imaging, Protonix bolus and drop, reassess.   Principal Discharge DX:	UTI (urinary tract infection)  Assessment and plan of treatment:	Plan: EKG, CXR, labs, urine, imaging, Protonix bolus and drop, reassess.  Secondary Diagnosis:	BRBPR (bright red blood per rectum)  Secondary Diagnosis:	Abdominal pain

## 2021-03-11 NOTE — CONSULT NOTE ADULT - ASSESSMENT
85yFemale pmh HTN, high cholesterol, alzheimer's presents, TIA on aspirin and plavix presents for 5 episodes of rectal bleeding bright red blood since yesterday    Problem 1-Rectal bleeding  ddx hemorrhoidal, diverticular, malignancy   patient has never had an EGD/Colonoscopy before   Rec  -CT angio if positive IR consult for embolization if negative colonoscopy in 5 days if okay to hold plavix after neuro evaluation   -Clear liquids no red dye  -Neurology consult to see if plavix can be held  -Maintain Hemodynamic Stability   -Monitor CBC  -CMP,Optimize Electrolytes  -PT,PTT,INR  -EKG, Chest-Xray   -Transfuse prn to hgb >8  -Two large bore IV lines  - PPI BID  -Monitor Vital Signs  -Monitor Stool For blood, frequency, consistency, melena  -Active Type and Screen 85yFemale pmh HTN, high cholesterol, alzheimer's presents, TIA, CAD with stents 2018 on aspirin and plavix presents for 5 episodes of rectal bleeding bright red blood since yesterday    Problem 1-Rectal bleeding  ddx hemorrhoidal, diverticular, malignancy   patient has never had an EGD/Colonoscopy before   Rec  -CT angio negative   - colonoscopy in 4-5 days if okay to hold plavix after cardio evaluation   -Please obtain ECHO  -Clear liquids no red dye  -Neurology consult to see if plavix can be held  -Maintain Hemodynamic Stability   -Monitor CBC  -CMP,Optimize Electrolytes  -PT,PTT,INR  -EKG, Chest-Xray   -Transfuse prn to hgb >8  -Two large bore IV lines  - PPI BID  -Monitor Vital Signs  -Monitor Stool For blood, frequency, consistency, melena  -Active Type and Screen    Intrahepatic and extrahepatic biliary dilation, with CBD distended up to 1.1 cm; correlation with LFTs recommended.   Rec  -LFTs normal, patient without pain  -outpatient MRCP unless LFTs rise    85yFemale pmh HTN, high cholesterol, alzheimer's presents, TIA on aspirin and plavix presents for 5 episodes of rectal bleeding bright red blood since yesterday    Problem 1-Rectal bleeding  ddx hemorrhoidal, diverticular, malignancy   patient has never had an EGD/Colonoscopy before   Rec  -CT angio negative   - colonoscopy in 4-5 days if okay to hold plavix, okay from neuro standpoint to hold plavix will plan colonoscopy accordingly   -Colonoscopy to be done earlier if active GI bleeding or hemodynamic instability   -Please obtain ECHO  -regular diet as tolerated for now   -Maintain Hemodynamic Stability   -Monitor CBC  -CMP,Optimize Electrolytes  -PT,PTT,INR  -EKG, Chest-Xray   -Transfuse prn to hgb >8  -Two large bore IV lines  - PPI BID  -Monitor Vital Signs  -Monitor Stool For blood, frequency, consistency, melena  -Active Type and Screen    Problem 2-Intrahepatic and extrahepatic biliary dilation, with CBD distended up to 1.1 cm; correlation with LFTs recommended.   Rec  -LFTs normal, patient without pain  -outpatient MRCP unless LFTs rise     Problem 3- Findings consistent with urinary bladder cystitis. Mild left hydronephrosis with increased urothelial enhancement of left renal pelvis without calculus. Correlate for ascending urinary tract infection, left pyelonephritis.  Rec  - Care as per primary team    85yFemale pmh HTN, high cholesterol, alzheimer's presents, TIA on aspirin and plavix presents for 5 episodes of rectal bleeding bright red blood since yesterday    Problem 1-Rectal bleeding  ddx hemorrhoidal, diverticular, malignancy   patient has never had an EGD/Colonoscopy before   Rec  -CT angio negative   - colonoscopy in 4-5 days if okay to hold plavix, okay from neuro standpoint to hold plavix will plan colonoscopy accordingly   -Can continue with aspirin just hold plavix  -Colonoscopy to be done earlier if active GI bleeding or hemodynamic instability   -Please obtain ECHO  -regular diet as tolerated for now   -Maintain Hemodynamic Stability   -Monitor CBC  -CMP,Optimize Electrolytes  -PT,PTT,INR  -EKG, Chest-Xray   -Transfuse prn to hgb >8  -Two large bore IV lines  - PPI BID  -Monitor Vital Signs  -Monitor Stool For blood, frequency, consistency, melena  -Active Type and Screen    Problem 2-Intrahepatic and extrahepatic biliary dilation, with CBD distended up to 1.1 cm; correlation with LFTs recommended.   Rec  -LFTs normal, patient without pain  -outpatient MRCP unless LFTs rise     Problem 3- Findings consistent with urinary bladder cystitis. Mild left hydronephrosis with increased urothelial enhancement of left renal pelvis without calculus. Correlate for ascending urinary tract infection, left pyelonephritis.  Rec  - Care as per primary team

## 2021-03-11 NOTE — PHARMACOTHERAPY INTERVENTION NOTE - COMMENTS
As per Dr. Stubbs: MD spoke to family, pt is not allergic to PCN -The allergy is false. MD was asked to remove PCN allergy.

## 2021-03-11 NOTE — ED ADULT NURSE NOTE - NSIMPLEMENTINTERV_GEN_ALL_ED
Implemented All Fall with Harm Risk Interventions:  [place] to call system. Call bell, personal items and telephone within reach. Instruct patient to call for assistance. Room bathroom lighting operational. Non-slip footwear when patient is off stretcher. Physically safe environment: no spills, clutter or unnecessary equipment. Stretcher in lowest position, wheels locked, appropriate side rails in place. Provide visual cue, wrist band, yellow gown, etc. Monitor gait and stability. Monitor for mental status changes and reorient to person, place, and time. Review medications for side effects contributing to fall risk. Reinforce activity limits and safety measures with patient and family. Provide visual clues: red socks.

## 2021-03-11 NOTE — CONSULT NOTE ADULT - SUBJECTIVE AND OBJECTIVE BOX
Chief complaint/Reason for consult: rectal bleeding    HPI: 85yFemale pmh HTN, high cholesterol, alzheimer's presents, TIA on aspirin and plavix presents for 5 episodes of rectal bleeding bright red blood since yesterday. History from medical chart as patient unable to give history.      PAST MEDICAL & SURGICAL HISTORY:   Alzheimer&#x27;s dementia without behavioral disturbance, unspecified timing of dementia onset    Hypothyroid    High cholesterol    Hypertension    No significant past surgical history          Family history:  FAMILY HISTORY:  No pertinent family history in first degree relatives      No GI cancers in first or second degree relatives    Social History: No smoking. No alcohol. No illegal drug use.    Allergies:  penicillin (Unknown)        MEDICATIONS: Home Medications:  aspirin 81 mg oral tablet, chewable: 1 tab(s) orally once a day (11 Mar 2021 09:04)  azithromycin:  (11 Mar 2021 09:04)  donepezil 5 mg oral tablet: 1 tab(s) orally once a day (at bedtime) (11 Mar 2021 09:04)  famotidine 20 mg oral tablet: 1 tab(s) orally 2 times a day (11 Mar 2021 09:04)  folic acid 1 mg oral tablet: 1 tab(s) orally once a day (11 Mar 2021 09:04)  furosemide:  (11 Mar 2021 09:04)  hydrocortisone:  (11 Mar 2021 09:04)  Lopressor: 25 milligram(s) orally 2 times a day (11 Mar 2021 09:04)  Plavix 75 mg oral tablet: 1 tab(s) orally once a day (11 Mar 2021 09:04)  simvastatin 20 mg oral tablet: 1 tab(s) orally once a day (at bedtime) (11 Mar 2021 09:04)    MEDICATIONS  (STANDING):  pantoprazole Infusion 8 mG/Hr (10 mL/Hr) IV Continuous <Continuous>        REVIEW OF SYSTEMS  unobtainable    VITALS:   T(F): 98 (03-11-21 @ 08:55), Max: 98 (03-11-21 @ 08:55)  HR: 71 (03-11-21 @ 12:08) (60 - 71)  BP: 183/81 (03-11-21 @ 12:08) (183/81 - 197/82)  RR: 18 (03-11-21 @ 12:08) (18 - 18)  SpO2: 97% (03-11-21 @ 12:08) (97% - 99%)    PHYSICAL EXAM:  GENERAL: does not respond appropriately to prompts   HEAD:  Atraumatic, Normocephalic  EYES: conjunctiva and sclera clear  NECK: Supple, No thyromegaly   CHEST/LUNG: Clear to auscultation bilaterally; No wheeze, rhonchi, or rales  HEART: Regular rate and rhythm; normal S1, S2, No murmurs.  ABDOMEN: Soft, nontender, nondistended; Bowel sounds present  NEUROLOGY: No asterixis or tremor  SKIN: Intact, no jaundice  Rectal exam-trace red blood in rectal vault        LABS:  03-11    141  |  103  |  24<H>  ----------------------------<  109<H>  4.4   |  31  |  1.0    Ca    9.7      11 Mar 2021 09:29    TPro  7.0  /  Alb  4.2  /  TBili  0.5  /  DBili  x   /  AST  20  /  ALT  19  /  AlkPhos  86  03-11                          13.4   5.13  )-----------( 204      ( 11 Mar 2021 09:29 )             40.2     LIVER FUNCTIONS - ( 11 Mar 2021 09:29 )  Alb: 4.2 g/dL / Pro: 7.0 g/dL / ALK PHOS: 86 U/L / ALT: 19 U/L / AST: 20 U/L / GGT: x           PT/INR - ( 11 Mar 2021 09:29 )   PT: 11.10 sec;   INR: 0.97 ratio         PTT - ( 11 Mar 2021 09:29 )  PTT:27.7 sec    IMAGING:         Chief complaint/Reason for consult: rectal bleeding    HPI: 85yFemale pmh HTN, high cholesterol, alzheimer's presents, TIA, CAD with stents 2018 on  aspirin and plavix presents for 5 episodes of rectal bleeding bright red blood since yesterday. History from medical chart as patient unable to give history.      PAST MEDICAL & SURGICAL HISTORY:   Alzheimer&#x27;s dementia without behavioral disturbance, unspecified timing of dementia onset    Hypothyroid    High cholesterol    Hypertension    No significant past surgical history          Family history:  FAMILY HISTORY:  No pertinent family history in first degree relatives      No GI cancers in first or second degree relatives    Social History: No smoking. No alcohol. No illegal drug use.    Allergies:  penicillin (Unknown)        MEDICATIONS: Home Medications:  aspirin 81 mg oral tablet, chewable: 1 tab(s) orally once a day (11 Mar 2021 09:04)  azithromycin:  (11 Mar 2021 09:04)  donepezil 5 mg oral tablet: 1 tab(s) orally once a day (at bedtime) (11 Mar 2021 09:04)  famotidine 20 mg oral tablet: 1 tab(s) orally 2 times a day (11 Mar 2021 09:04)  folic acid 1 mg oral tablet: 1 tab(s) orally once a day (11 Mar 2021 09:04)  furosemide:  (11 Mar 2021 09:04)  hydrocortisone:  (11 Mar 2021 09:04)  Lopressor: 25 milligram(s) orally 2 times a day (11 Mar 2021 09:04)  Plavix 75 mg oral tablet: 1 tab(s) orally once a day (11 Mar 2021 09:04)  simvastatin 20 mg oral tablet: 1 tab(s) orally once a day (at bedtime) (11 Mar 2021 09:04)    MEDICATIONS  (STANDING):  pantoprazole Infusion 8 mG/Hr (10 mL/Hr) IV Continuous <Continuous>        REVIEW OF SYSTEMS  unobtainable    VITALS:   T(F): 98 (03-11-21 @ 08:55), Max: 98 (03-11-21 @ 08:55)  HR: 71 (03-11-21 @ 12:08) (60 - 71)  BP: 183/81 (03-11-21 @ 12:08) (183/81 - 197/82)  RR: 18 (03-11-21 @ 12:08) (18 - 18)  SpO2: 97% (03-11-21 @ 12:08) (97% - 99%)    PHYSICAL EXAM:  GENERAL: does not respond appropriately to prompts   HEAD:  Atraumatic, Normocephalic  EYES: conjunctiva and sclera clear  NECK: Supple, No thyromegaly   CHEST/LUNG: Clear to auscultation bilaterally; No wheeze, rhonchi, or rales  HEART: Regular rate and rhythm; normal S1, S2, No murmurs.  ABDOMEN: Soft, nontender, nondistended; Bowel sounds present  NEUROLOGY: No asterixis or tremor  SKIN: Intact, no jaundice  Rectal exam-trace red blood in rectal vault        LABS:  03-11    141  |  103  |  24<H>  ----------------------------<  109<H>  4.4   |  31  |  1.0    Ca    9.7      11 Mar 2021 09:29    TPro  7.0  /  Alb  4.2  /  TBili  0.5  /  DBili  x   /  AST  20  /  ALT  19  /  AlkPhos  86  03-11                          13.4   5.13  )-----------( 204      ( 11 Mar 2021 09:29 )             40.2     LIVER FUNCTIONS - ( 11 Mar 2021 09:29 )  Alb: 4.2 g/dL / Pro: 7.0 g/dL / ALK PHOS: 86 U/L / ALT: 19 U/L / AST: 20 U/L / GGT: x           PT/INR - ( 11 Mar 2021 09:29 )   PT: 11.10 sec;   INR: 0.97 ratio         PTT - ( 11 Mar 2021 09:29 )  PTT:27.7 sec    < from: Xray Chest 1 View- PORTABLE-Urgent (Xray Chest 1 View- PORTABLE-Urgent .) (03.11.21 @ 09:46) >  IMAGING:        < from: CT Angio Abdomen and Pelvis w/ IV Cont (03.11.21 @ 13:22) >  EXAM:  CT ANGIO ABD PELV (W)AW IC            PROCEDURE DATE:  03/11/2021            INTERPRETATION:  CTA abdomen and pelvis with and without IV contrast    CLINICAL HISTORY/REASON FOR EXAM: Rectal bleeding. Gastrointestinal bleeding..    TECHNIQUE:CTA abdomen and pelvis with and without IV contrast (gastrointestinal bleeding protocol). Contiguous axial CTA images of the abdomen and pelvis were obtained before and after the administration of 100 cc Optiray 350 intravenous contrast. Arterial andvenous phase images obtained. 0 cc contrast discarded. Oral contrast was not administered. Reformatted images in the coronal and sagittal planes were acquired. 3-D/MIP images obtained.    COMPARISON: None.      FINDINGS:    LOWER CHEST: Left basilar subsegmental atelectasis.    HEPATOBILIARY: Intrahepatic and extrahepatic biliary dilation, with CBD distended up to 1.1 cm. liver unremarkable.    SPLEEN: Unremarkable.    PANCREAS: Unremarkable.    ADRENAL GLANDS: Unremarkable.    KIDNEYS: Mild lefthydronephrosis with increased urothelial enhancement of left renal pelvis. Right kidney unremarkable.    ABDOMINOPELVIC NODES: No lymphadenopathy.    PELVIC ORGANS: Urinary bladder partially sent with Valadez catheter. Abnormal urinary bladder mucosal hyperenhancement, pericystic inflammatory stranding, and wall thickening up to 0.8 cm.    PERITONEUM/MESENTERY/BOWEL: No evidence of gastrointestinal arterial extravasation or venous pooling to suggest active intestinal bleed. No bowel obstruction. Noascites or pneumoperitoneum. Normal appendix.    BONES/SOFT TISSUES: Chronic left inferior pubic ramus fracture. Osteopenia. Degenerative changes of spine.    OTHER: Vascular calcifications.      IMPRESSION:    1. No evidence of active intestinal bleed    2. Findings consistent with urinary bladder cystitis. Mild left hydronephrosis with increased urothelial enhancement of left renal pelvis without calculus. Correlate for ascending urinary tract infection, left pyelonephritis.    3. Intrahepatic and extrahepatic biliary dilation, with CBD distended up to 1.1 cm; correlation with LFTs recommended. Consider nonemergent MRCP if clinically warranted.              DEBBIE FAYE MD; Attending Radiologist  This document has been electronically signed.Mar 11 2021  1:31PM    < end of copied text >    < from: Xray Chest 1 View- PORTABLE-Urgent (Xray Chest 1 View- PORTABLE-Urgent .) (03.11.21 @ 09:46) >    EXAM:  XR CHEST PORTABLE URGENT 1V            PROCEDURE DATE:  03/11/2021            INTERPRETATION:  CLINICAL HISTORY: admission, possible preop.    COMPARISON: 3/12/2018.    TECHNIQUE: Portable frontal chest radiograph. Adequate positioning.    FINDINGS:    Support devices: None.    Cardiac/mediastinum/hilum: Stable.    Lung parenchyma/Pleura: No focal parenchymal opacities, pleural effusions, or pneumothorax.    Skeleton/soft tissues: Stable.      IMPRESSION:    No radiographic evidence of acute cardiopulmonary disease.              YAO HUGHES MD; Attending Radiologist  This document has been electronically signed. Mar 11 2021  2:07PM    < end of copied text >     Chief complaint/Reason for consult: rectal bleeding    HPI: 85yFemale pmh HTN, high cholesterol, alzheimer's presents, TIA on  aspirin and plavix presents for 5 episodes of rectal bleeding bright red blood since yesterday. History from medical chart as patient unable to give history.      PAST MEDICAL & SURGICAL HISTORY:   Alzheimer&#x27;s dementia without behavioral disturbance, unspecified timing of dementia onset    Hypothyroid    High cholesterol    Hypertension    No significant past surgical history          Family history:  FAMILY HISTORY:  No pertinent family history in first degree relatives      No GI cancers in first or second degree relatives    Social History: No smoking. No alcohol. No illegal drug use.    Allergies:  penicillin (Unknown)        MEDICATIONS: Home Medications:  aspirin 81 mg oral tablet, chewable: 1 tab(s) orally once a day (11 Mar 2021 09:04)  azithromycin:  (11 Mar 2021 09:04)  donepezil 5 mg oral tablet: 1 tab(s) orally once a day (at bedtime) (11 Mar 2021 09:04)  famotidine 20 mg oral tablet: 1 tab(s) orally 2 times a day (11 Mar 2021 09:04)  folic acid 1 mg oral tablet: 1 tab(s) orally once a day (11 Mar 2021 09:04)  furosemide:  (11 Mar 2021 09:04)  hydrocortisone:  (11 Mar 2021 09:04)  Lopressor: 25 milligram(s) orally 2 times a day (11 Mar 2021 09:04)  Plavix 75 mg oral tablet: 1 tab(s) orally once a day (11 Mar 2021 09:04)  simvastatin 20 mg oral tablet: 1 tab(s) orally once a day (at bedtime) (11 Mar 2021 09:04)    MEDICATIONS  (STANDING):  pantoprazole Infusion 8 mG/Hr (10 mL/Hr) IV Continuous <Continuous>        REVIEW OF SYSTEMS  unobtainable    VITALS:   T(F): 98 (03-11-21 @ 08:55), Max: 98 (03-11-21 @ 08:55)  HR: 71 (03-11-21 @ 12:08) (60 - 71)  BP: 183/81 (03-11-21 @ 12:08) (183/81 - 197/82)  RR: 18 (03-11-21 @ 12:08) (18 - 18)  SpO2: 97% (03-11-21 @ 12:08) (97% - 99%)    PHYSICAL EXAM:  GENERAL: does not respond appropriately to prompts   HEAD:  Atraumatic, Normocephalic  EYES: conjunctiva and sclera clear  NECK: Supple, No thyromegaly   CHEST/LUNG: Clear to auscultation bilaterally; No wheeze, rhonchi, or rales  HEART: Regular rate and rhythm; normal S1, S2, No murmurs.  ABDOMEN: Soft, nontender, nondistended; Bowel sounds present  NEUROLOGY: No asterixis or tremor  SKIN: Intact, no jaundice  Rectal exam-trace red blood in rectal vault        LABS:  03-11    141  |  103  |  24<H>  ----------------------------<  109<H>  4.4   |  31  |  1.0    Ca    9.7      11 Mar 2021 09:29    TPro  7.0  /  Alb  4.2  /  TBili  0.5  /  DBili  x   /  AST  20  /  ALT  19  /  AlkPhos  86  03-11                          13.4   5.13  )-----------( 204      ( 11 Mar 2021 09:29 )             40.2     LIVER FUNCTIONS - ( 11 Mar 2021 09:29 )  Alb: 4.2 g/dL / Pro: 7.0 g/dL / ALK PHOS: 86 U/L / ALT: 19 U/L / AST: 20 U/L / GGT: x           PT/INR - ( 11 Mar 2021 09:29 )   PT: 11.10 sec;   INR: 0.97 ratio         PTT - ( 11 Mar 2021 09:29 )  PTT:27.7 sec    < from: Xray Chest 1 View- PORTABLE-Urgent (Xray Chest 1 View- PORTABLE-Urgent .) (03.11.21 @ 09:46) >  IMAGING:        < from: CT Angio Abdomen and Pelvis w/ IV Cont (03.11.21 @ 13:22) >  EXAM:  CT ANGIO ABD PELV (W)AW IC            PROCEDURE DATE:  03/11/2021            INTERPRETATION:  CTA abdomen and pelvis with and without IV contrast    CLINICAL HISTORY/REASON FOR EXAM: Rectal bleeding. Gastrointestinal bleeding..    TECHNIQUE:CTA abdomen and pelvis with and without IV contrast (gastrointestinal bleeding protocol). Contiguous axial CTA images of the abdomen and pelvis were obtained before and after the administration of 100 cc Optiray 350 intravenous contrast. Arterial andvenous phase images obtained. 0 cc contrast discarded. Oral contrast was not administered. Reformatted images in the coronal and sagittal planes were acquired. 3-D/MIP images obtained.    COMPARISON: None.      FINDINGS:    LOWER CHEST: Left basilar subsegmental atelectasis.    HEPATOBILIARY: Intrahepatic and extrahepatic biliary dilation, with CBD distended up to 1.1 cm. liver unremarkable.    SPLEEN: Unremarkable.    PANCREAS: Unremarkable.    ADRENAL GLANDS: Unremarkable.    KIDNEYS: Mild lefthydronephrosis with increased urothelial enhancement of left renal pelvis. Right kidney unremarkable.    ABDOMINOPELVIC NODES: No lymphadenopathy.    PELVIC ORGANS: Urinary bladder partially sent with Valadez catheter. Abnormal urinary bladder mucosal hyperenhancement, pericystic inflammatory stranding, and wall thickening up to 0.8 cm.    PERITONEUM/MESENTERY/BOWEL: No evidence of gastrointestinal arterial extravasation or venous pooling to suggest active intestinal bleed. No bowel obstruction. Noascites or pneumoperitoneum. Normal appendix.    BONES/SOFT TISSUES: Chronic left inferior pubic ramus fracture. Osteopenia. Degenerative changes of spine.    OTHER: Vascular calcifications.      IMPRESSION:    1. No evidence of active intestinal bleed    2. Findings consistent with urinary bladder cystitis. Mild left hydronephrosis with increased urothelial enhancement of left renal pelvis without calculus. Correlate for ascending urinary tract infection, left pyelonephritis.    3. Intrahepatic and extrahepatic biliary dilation, with CBD distended up to 1.1 cm; correlation with LFTs recommended. Consider nonemergent MRCP if clinically warranted.              DEBBIE FAYE MD; Attending Radiologist  This document has been electronically signed.Mar 11 2021  1:31PM    < end of copied text >    < from: Xray Chest 1 View- PORTABLE-Urgent (Xray Chest 1 View- PORTABLE-Urgent .) (03.11.21 @ 09:46) >    EXAM:  XR CHEST PORTABLE URGENT 1V            PROCEDURE DATE:  03/11/2021            INTERPRETATION:  CLINICAL HISTORY: admission, possible preop.    COMPARISON: 3/12/2018.    TECHNIQUE: Portable frontal chest radiograph. Adequate positioning.    FINDINGS:    Support devices: None.    Cardiac/mediastinum/hilum: Stable.    Lung parenchyma/Pleura: No focal parenchymal opacities, pleural effusions, or pneumothorax.    Skeleton/soft tissues: Stable.      IMPRESSION:    No radiographic evidence of acute cardiopulmonary disease.              YAO HUGHES MD; Attending Radiologist  This document has been electronically signed. Mar 11 2021  2:07PM    < end of copied text >

## 2021-03-11 NOTE — ED ADULT TRIAGE NOTE - CHIEF COMPLAINT QUOTE
EMS states that pt has had diarrhea since last night and blood in her stool. Pt states she doesn't know why she is here  Pt answers "I don't know" to all questions

## 2021-03-11 NOTE — ED PROVIDER NOTE - ATTENDING CONTRIBUTION TO CARE
84 y/o f w/ pmhx of severe dementia, htn, hld, tia in 2000 , on asa and plavix, history provided by son as pt poor historian and does not know why she is here (lives with son, has aide for ~ 6 hours) presents for rectal bleeding. son reports last night ~ 5 episodes of diarrhea yesterday since ~ 2 am, red in colored, then this morning has brbpr so called to have pt come to ed. no hx of colonoscopy. No known fever, vomiting, cough, not on any abx. no trauma or fall. reports baseline other than the diarrhea and blood.    Vital Signs: I have reviewed the initial vital signs. Constitutional: Elderly female sitting on stretcher speaking full sentences. Integumentary: No rash. No pallor. EYES: No pallor. ENT: MMM NECK: Supple, non-tender, no meningeal signs. Cardiovascular: RRR, radial pulses 2/4 b/l. No JVD. Respiratory: BS present b/l, ctabl, no wheezing or crackles, no accessory muscle use, no stridor. Gastrointestinal: BS present throughout all 4 quadrants, soft, nd, generalized abdominal pain to palpation, no rebound tenderness or guarding, no cvat. Rectal done by Resident Dr. Stubbs with supervision: BRBPR, good tone. Musculoskeletal: FROM, no edema, no calf pain/swelling/erythema. Neurologic: Pt with sever dementia, awake and alert following commands, No focal deficits.

## 2021-03-11 NOTE — H&P ADULT - NSHPLABSRESULTS_GEN_ALL_CORE
03-11    141  |  103  |  24<H>  ----------------------------<  109<H>  4.4   |  31  |  1.0    Ca    9.7      11 Mar 2021 09:29    TPro  7.0  /  Alb  4.2  /  TBili  0.5  /  DBili  x   /  AST  20  /  ALT  19  /  AlkPhos  86  03-11                            13.4   5.13  )-----------( 204      ( 11 Mar 2021 09:29 )             40.2     CAPILLARY BLOOD GLUCOSE        CARDIAC MARKERS ( 11 Mar 2021 09:29 )  x     / <0.01 ng/mL / x     / x     / x      < from: CT Angio Abdomen and Pelvis w/ IV Cont (03.11.21 @ 13:22) >        IMPRESSION:    1. No evidence of active intestinal bleed    2. Findings consistent with urinary bladder cystitis. Mild left hydronephrosis with increased urothelial enhancement of left renal pelvis without calculus. Correlate for ascending urinary tract infection, left pyelonephritis.    3. Intrahepatic and extrahepatic biliary dilation, with CBD distended up to 1.1 cm; correlation with LFTs recommended. Consider nonemergent MRCP if clinically warranted.    < end of copied text >    < from: Xray Chest 1 View- PORTABLE-Urgent (Xray Chest 1 View- PORTABLE-Urgent .) (03.11.21 @ 09:46) >        IMPRESSION:    No radiographic evidence of acute cardiopulmonary disease.    < end of copied text >

## 2021-03-11 NOTE — ED PROVIDER NOTE - PROGRESS NOTE DETAILS
DL- info yesterday diarrhea, 2am BRBPR   pmd- dr. dominguez, 6hr aid   tia 2000, no vomiting DL- Information and history obtained from patient's Son who reports patient has had 5 episodes of bright red bloody diarrhea yesterday since 2am and movements of just BRBPR this AM. No hx of colonoscopy in past. No vomiting or fevers. Pt lives at home with son and 6 hour/day aid. PMD: Dr. Bear ED Attending ERIK Cutler  Hgb 13/4, bun/cre 24/1.0, trop negative, pending imaging and ekg, pt resting on stretcher, will continue to monitor and reassess. DL- Spoke with Alexei, GI fellow recommending CT angio. Will evaluate. DL - Updated patient's son that patient will be staying in hospital, son reports patient is not allergic to penicllin and has no allergies to medications. ED Attending ERIK Cutler  Ct reviewed, abx given for uti, gi follow, on protonix, plan for admission.

## 2021-03-11 NOTE — H&P ADULT - NSICDXPASTMEDICALHX_GEN_ALL_CORE_FT
PAST MEDICAL HISTORY:  Alzheimer's dementia without behavioral disturbance, unspecified timing of dementia onset     High cholesterol     Hypertension     Hypothyroid

## 2021-03-11 NOTE — ED PROVIDER NOTE - CLINICAL SUMMARY MEDICAL DECISION MAKING FREE TEXT BOX
pt presented for brbpr, abd pain, (+) uti, abx given, brbpr, protonix drop started, gi following, imaging reviewed, son aware of results and admission, medical admitting team aware of pt and admission.

## 2021-03-12 LAB
ANION GAP SERPL CALC-SCNC: 7 MMOL/L — SIGNIFICANT CHANGE UP (ref 7–14)
BUN SERPL-MCNC: 21 MG/DL — HIGH (ref 10–20)
CALCIUM SERPL-MCNC: 9.2 MG/DL — SIGNIFICANT CHANGE UP (ref 8.5–10.1)
CHLORIDE SERPL-SCNC: 105 MMOL/L — SIGNIFICANT CHANGE UP (ref 98–110)
CO2 SERPL-SCNC: 30 MMOL/L — SIGNIFICANT CHANGE UP (ref 17–32)
CREAT SERPL-MCNC: 0.9 MG/DL — SIGNIFICANT CHANGE UP (ref 0.7–1.5)
CULTURE RESULTS: SIGNIFICANT CHANGE UP
GLUCOSE SERPL-MCNC: 82 MG/DL — SIGNIFICANT CHANGE UP (ref 70–99)
HCT VFR BLD CALC: 36 % — LOW (ref 37–47)
HGB BLD-MCNC: 11.9 G/DL — LOW (ref 12–16)
MAGNESIUM SERPL-MCNC: 2.4 MG/DL — SIGNIFICANT CHANGE UP (ref 1.8–2.4)
MCHC RBC-ENTMCNC: 30.3 PG — SIGNIFICANT CHANGE UP (ref 27–31)
MCHC RBC-ENTMCNC: 33.1 G/DL — SIGNIFICANT CHANGE UP (ref 32–37)
MCV RBC AUTO: 91.6 FL — SIGNIFICANT CHANGE UP (ref 81–99)
NRBC # BLD: 0 /100 WBCS — SIGNIFICANT CHANGE UP (ref 0–0)
PLATELET # BLD AUTO: 190 K/UL — SIGNIFICANT CHANGE UP (ref 130–400)
POTASSIUM SERPL-MCNC: 4.2 MMOL/L — SIGNIFICANT CHANGE UP (ref 3.5–5)
POTASSIUM SERPL-SCNC: 4.2 MMOL/L — SIGNIFICANT CHANGE UP (ref 3.5–5)
RBC # BLD: 3.93 M/UL — LOW (ref 4.2–5.4)
RBC # FLD: 14.5 % — SIGNIFICANT CHANGE UP (ref 11.5–14.5)
SARS-COV-2 IGG SERPL QL IA: NEGATIVE — SIGNIFICANT CHANGE UP
SARS-COV-2 IGM SERPL IA-ACNC: 0.07 INDEX — SIGNIFICANT CHANGE UP
SODIUM SERPL-SCNC: 142 MMOL/L — SIGNIFICANT CHANGE UP (ref 135–146)
SPECIMEN SOURCE: SIGNIFICANT CHANGE UP
WBC # BLD: 5.67 K/UL — SIGNIFICANT CHANGE UP (ref 4.8–10.8)
WBC # FLD AUTO: 5.67 K/UL — SIGNIFICANT CHANGE UP (ref 4.8–10.8)

## 2021-03-12 PROCEDURE — 99233 SBSQ HOSP IP/OBS HIGH 50: CPT

## 2021-03-12 RX ORDER — SOD SULF/SODIUM/NAHCO3/KCL/PEG
4000 SOLUTION, RECONSTITUTED, ORAL ORAL ONCE
Refills: 0 | Status: COMPLETED | OUTPATIENT
Start: 2021-03-14 | End: 2021-03-14

## 2021-03-12 RX ADMIN — SODIUM CHLORIDE 3 MILLILITER(S): 9 INJECTION INTRAMUSCULAR; INTRAVENOUS; SUBCUTANEOUS at 05:35

## 2021-03-12 RX ADMIN — PANTOPRAZOLE SODIUM 40 MILLIGRAM(S): 20 TABLET, DELAYED RELEASE ORAL at 05:47

## 2021-03-12 RX ADMIN — SODIUM CHLORIDE 3 MILLILITER(S): 9 INJECTION INTRAMUSCULAR; INTRAVENOUS; SUBCUTANEOUS at 13:42

## 2021-03-12 RX ADMIN — SODIUM CHLORIDE 3 MILLILITER(S): 9 INJECTION INTRAMUSCULAR; INTRAVENOUS; SUBCUTANEOUS at 21:41

## 2021-03-12 RX ADMIN — Medication 1 MILLIGRAM(S): at 11:21

## 2021-03-12 RX ADMIN — PANTOPRAZOLE SODIUM 40 MILLIGRAM(S): 20 TABLET, DELAYED RELEASE ORAL at 17:19

## 2021-03-12 RX ADMIN — CEFTRIAXONE 100 MILLIGRAM(S): 500 INJECTION, POWDER, FOR SOLUTION INTRAMUSCULAR; INTRAVENOUS at 13:42

## 2021-03-12 RX ADMIN — DONEPEZIL HYDROCHLORIDE 5 MILLIGRAM(S): 10 TABLET, FILM COATED ORAL at 21:41

## 2021-03-12 RX ADMIN — CHLORHEXIDINE GLUCONATE 1 APPLICATION(S): 213 SOLUTION TOPICAL at 05:46

## 2021-03-12 RX ADMIN — Medication 25 MILLIGRAM(S): at 05:47

## 2021-03-12 RX ADMIN — SIMVASTATIN 20 MILLIGRAM(S): 20 TABLET, FILM COATED ORAL at 21:41

## 2021-03-12 NOTE — PROGRESS NOTE ADULT - ASSESSMENT
[id number] pmh HTN, high cholesterol, alzheimer's presents, TIA on aspirin and plavix presents for episodes of rectal bleeding bright red blood since yesterday    # Rectal bleeding poss due to  hemorrhoidal, diverticular,  or malignancy   - colonoscopy in 5 days if okay to hold plavix as per GI  -Clear liquids no red dye  -Maintain Hemodynamic Stability   -Monitor CBC  -Two large bore IV lines  -PPI BID  -Monitor Vital Signs  -Monitor Stool For blood, frequency, consistency, melena  -Active Type and Screen    # UTI, with concern for pyelonephritis   on CT ascending UTI, no fever or increase wbc , UA neg nit   - will d/c cadet   - send for urine cx   - c/w Rocephin for now     # HTN   - c/w metoprolol 25 mg qd  - low NA diet     # H/O TIA ?2000  - on asa and plavix , will hold both as no indication is seen   will restart antiplates after GI procedure    # HLD  - c/w statin     # alzheimer  - c/w home meds       hold VTE meds , will place b/l seq

## 2021-03-12 NOTE — PROGRESS NOTE ADULT - ASSESSMENT
85yFemale pmh HTN, high cholesterol, alzheimer's presents, TIA on aspirin and plavix presents for 5 episodes of rectal bleeding bright red blood since yesterday    Problem 1-Rectal bleeding  ddx hemorrhoidal, diverticular, malignancy   patient has never had an EGD/Colonoscopy before   Rec  -CT angio negative   -Clonoscopy in 4-5 days if okay to hold plavix, okay from neuro standpoint to hold plavix will plan colonoscopy accordingly   -Can continue with aspirin just hold plavix  -Colonoscopy to be done earlier if active GI bleeding or hemodynamic instability   -Please obtain ECHO  -regular diet as tolerated for now   -Maintain Hemodynamic Stability   -Monitor CBC  -CMP,Optimize Electrolytes  -PT,PTT,INR  -EKG, Chest-Xray   -Transfuse prn to hgb >8  -Two large bore IV lines  - PPI BID  -Monitor Vital Signs  -Monitor Stool For blood, frequency, consistency, melena  -Active Type and Screen    Problem 2-Intrahepatic and extrahepatic biliary dilation, with CBD distended up to 1.1 cm; correlation with LFTs recommended.   Rec  -LFTs normal, patient without pain  -outpatient MRCP unless LFTs rise     Problem 3- Findings consistent with urinary bladder cystitis. Mild left hydronephrosis with increased urothelial enhancement of left renal pelvis without calculus. Correlate for ascending urinary tract infection, left pyelonephritis.  Rec  - Care as per primary team    85yFemale pmh HTN, high cholesterol, alzheimer's presents, TIA on aspirin and plavix presents for 5 episodes of rectal bleeding bright red blood since yesterday    Problem 1-Rectal bleeding  ddx hemorrhoidal, diverticular, malignancy   patient has never had an EGD/Colonoscopy before   Rec  -CT angio negative for active GI bleeding  -Colonoscopy Monday  Colonoscopy Prep for Sunday into Monday  -NPO aftermidnight   -AM CBC, AM CMP, AM INR, PT, PTT  -Go-lytely 4L Prior day before Colonoscopy   -Four tablets Dulcolax two at 2pm and two at 6pm Prior to Colonoscopy   -Two tap water enemas   -Clear Liquids Day, no red dye before Colonoscopy  -Can continue with aspirin just hold plavix  -Colonoscopy to be done earlier if active GI bleeding or hemodynamic instability   -f/u ECHO  -regular diet as tolerated for now   -Maintain Hemodynamic Stability   -Monitor CBC  -CMP,Optimize Electrolytes  -PT,PTT,INR  -Transfuse prn to hgb >8  -Two large bore IV lines  - PPI BID  -Monitor Vital Signs  -Monitor Stool For blood, frequency, consistency, melena  -Active Type and Screen    Problem 2-Intrahepatic and extrahepatic biliary dilation, with CBD distended up to 1.1 cm; correlation with LFTs recommended.   Rec  -LFTs normal, patient without pain  -outpatient MRCP unless LFTs rise     Problem 3- Findings consistent with urinary bladder cystitis. Mild left hydronephrosis with increased urothelial enhancement of left renal pelvis without calculus. Correlate for ascending urinary tract infection, left pyelonephritis.  Rec  - Care as per primary team    85yFemale pmh HTN, high cholesterol, alzheimer's presents, TIA on aspirin and plavix presents for 5 episodes of rectal bleeding bright red blood since yesterday    Problem 1-Rectal bleeding  ddx hemorrhoidal, diverticular, malignancy   patient has never had an EGD/Colonoscopy before   Rec  -CT angio negative for active GI bleeding  -can feed patient regular diet until Sunday  -attempted to call son multiple times today without success   -Colonoscopy Monday  Colonoscopy Prep for Sunday into Monday  -NPO aftermidnight   -AM CBC, AM CMP, AM INR, PT, PTT  -Go-lytely 4L Prior day before Colonoscopy   -Four tablets Dulcolax two at 2pm and two at 6pm Prior to Colonoscopy   -Two tap water enemas   -Clear Liquids Day, no red dye before Colonoscopy  -Can continue with aspirin just hold plavix  -Colonoscopy to be done earlier if active GI bleeding or hemodynamic instability   -f/u ECHO  -regular diet as tolerated for now   -Maintain Hemodynamic Stability   -Monitor CBC  -CMP,Optimize Electrolytes  -PT,PTT,INR  -Transfuse prn to hgb >8  -Two large bore IV lines  - PPI BID  -Monitor Vital Signs  -Monitor Stool For blood, frequency, consistency, melena  -Active Type and Screen    Problem 2-Intrahepatic and extrahepatic biliary dilation, with CBD distended up to 1.1 cm; correlation with LFTs recommended.   Rec  -LFTs normal, patient without pain  -outpatient MRCP unless LFTs rise     Problem 3- Findings consistent with urinary bladder cystitis. Mild left hydronephrosis with increased urothelial enhancement of left renal pelvis without calculus. Correlate for ascending urinary tract infection, left pyelonephritis.  Rec  - Care as per primary team

## 2021-03-13 LAB
ANION GAP SERPL CALC-SCNC: 5 MMOL/L — LOW (ref 7–14)
APTT BLD: 30.5 SEC — SIGNIFICANT CHANGE UP (ref 27–39.2)
BUN SERPL-MCNC: 15 MG/DL — SIGNIFICANT CHANGE UP (ref 10–20)
CALCIUM SERPL-MCNC: 8.9 MG/DL — SIGNIFICANT CHANGE UP (ref 8.5–10.1)
CHLORIDE SERPL-SCNC: 103 MMOL/L — SIGNIFICANT CHANGE UP (ref 98–110)
CO2 SERPL-SCNC: 31 MMOL/L — SIGNIFICANT CHANGE UP (ref 17–32)
CREAT SERPL-MCNC: 0.8 MG/DL — SIGNIFICANT CHANGE UP (ref 0.7–1.5)
GLUCOSE SERPL-MCNC: 89 MG/DL — SIGNIFICANT CHANGE UP (ref 70–99)
HCT VFR BLD CALC: 38.1 % — SIGNIFICANT CHANGE UP (ref 37–47)
HGB BLD-MCNC: 12.6 G/DL — SIGNIFICANT CHANGE UP (ref 12–16)
INR BLD: 1.02 RATIO — SIGNIFICANT CHANGE UP (ref 0.65–1.3)
MCHC RBC-ENTMCNC: 29.8 PG — SIGNIFICANT CHANGE UP (ref 27–31)
MCHC RBC-ENTMCNC: 33.1 G/DL — SIGNIFICANT CHANGE UP (ref 32–37)
MCV RBC AUTO: 90.1 FL — SIGNIFICANT CHANGE UP (ref 81–99)
NRBC # BLD: 0 /100 WBCS — SIGNIFICANT CHANGE UP (ref 0–0)
PLATELET # BLD AUTO: 207 K/UL — SIGNIFICANT CHANGE UP (ref 130–400)
POTASSIUM SERPL-MCNC: 4.3 MMOL/L — SIGNIFICANT CHANGE UP (ref 3.5–5)
POTASSIUM SERPL-SCNC: 4.3 MMOL/L — SIGNIFICANT CHANGE UP (ref 3.5–5)
PROTHROM AB SERPL-ACNC: 11.7 SEC — SIGNIFICANT CHANGE UP (ref 9.95–12.87)
RBC # BLD: 4.23 M/UL — SIGNIFICANT CHANGE UP (ref 4.2–5.4)
RBC # FLD: 14.2 % — SIGNIFICANT CHANGE UP (ref 11.5–14.5)
SODIUM SERPL-SCNC: 139 MMOL/L — SIGNIFICANT CHANGE UP (ref 135–146)
WBC # BLD: 5.4 K/UL — SIGNIFICANT CHANGE UP (ref 4.8–10.8)
WBC # FLD AUTO: 5.4 K/UL — SIGNIFICANT CHANGE UP (ref 4.8–10.8)

## 2021-03-13 PROCEDURE — 99232 SBSQ HOSP IP/OBS MODERATE 35: CPT

## 2021-03-13 RX ORDER — HYDRALAZINE HCL 50 MG
10 TABLET ORAL ONCE
Refills: 0 | Status: COMPLETED | OUTPATIENT
Start: 2021-03-13 | End: 2021-03-13

## 2021-03-13 RX ORDER — ASPIRIN/CALCIUM CARB/MAGNESIUM 324 MG
81 TABLET ORAL DAILY
Refills: 0 | Status: DISCONTINUED | OUTPATIENT
Start: 2021-03-13 | End: 2021-03-15

## 2021-03-13 RX ADMIN — CHLORHEXIDINE GLUCONATE 1 APPLICATION(S): 213 SOLUTION TOPICAL at 06:17

## 2021-03-13 RX ADMIN — CEFTRIAXONE 100 MILLIGRAM(S): 500 INJECTION, POWDER, FOR SOLUTION INTRAMUSCULAR; INTRAVENOUS at 14:09

## 2021-03-13 RX ADMIN — DONEPEZIL HYDROCHLORIDE 5 MILLIGRAM(S): 10 TABLET, FILM COATED ORAL at 22:00

## 2021-03-13 RX ADMIN — PANTOPRAZOLE SODIUM 40 MILLIGRAM(S): 20 TABLET, DELAYED RELEASE ORAL at 17:16

## 2021-03-13 RX ADMIN — Medication 25 MILLIGRAM(S): at 06:16

## 2021-03-13 RX ADMIN — Medication 1 MILLIGRAM(S): at 11:44

## 2021-03-13 RX ADMIN — SODIUM CHLORIDE 3 MILLILITER(S): 9 INJECTION INTRAMUSCULAR; INTRAVENOUS; SUBCUTANEOUS at 22:00

## 2021-03-13 RX ADMIN — SIMVASTATIN 20 MILLIGRAM(S): 20 TABLET, FILM COATED ORAL at 22:00

## 2021-03-13 RX ADMIN — SODIUM CHLORIDE 3 MILLILITER(S): 9 INJECTION INTRAMUSCULAR; INTRAVENOUS; SUBCUTANEOUS at 06:17

## 2021-03-13 RX ADMIN — SODIUM CHLORIDE 3 MILLILITER(S): 9 INJECTION INTRAMUSCULAR; INTRAVENOUS; SUBCUTANEOUS at 11:46

## 2021-03-13 RX ADMIN — PANTOPRAZOLE SODIUM 40 MILLIGRAM(S): 20 TABLET, DELAYED RELEASE ORAL at 06:16

## 2021-03-13 NOTE — PROGRESS NOTE ADULT - ASSESSMENT
Patient with Rectal bleed Etiologies include Diverticular/ AVM/ other    1. Currently no active bleed noted.   Follow up Hgb/Hct/ BP/ HR  Patient for planned colonoscopy. Risks, benefits, Alternatives  Continue to monitor clinically for evidence of recurrence of bleed.

## 2021-03-13 NOTE — PROGRESS NOTE ADULT - ASSESSMENT
[id number] pmh HTN, high cholesterol, alzheimer's presents, TIA on aspirin and plavix presents for episodes of rectal bleeding bright red blood since yesterday    CT angio abd/pelvis 3/11  1. No evidence of active intestinal bleed  2. Findings consistent with urinary bladder cystitis. Mild left hydronephrosis with increased urothelial enhancement of left renal pelvis without calculus. Correlate for ascending urinary tract infection, left pyelonephritis.  3. Intrahepatic and extrahepatic biliary dilation, with CBD distended up to 1.1 cm; correlation with LFTs recommended. Consider nonemergent MRCP if clinically warranted.    # Rectal bleeding poss due to  hemorrhoidal, diverticular/AVM  - colonoscopy planned for monday  -Clear liquids  -PPI BID  -Active Type and Screen  - monitor vitals, CBC  colonoscopy prep ordered by GI   NPO after Sunday night    # UTI, with concern for left pyelonephritis  UA- <10,000 normal urogenital kirstin  - c/w Rocephin for now     # HTN   - c/w metoprolol 25 mg qd  - low NA diet     # H/O TIA in 2000  - on asa and plavix as OP  GI cleared to restart aspirin; hold plavix    # HLD  - c/w statin     # alzheimer  - c/w home meds       hold VTE meds , cont b/l SCD  discussed with son. Plan of care updated. requested a call from GI

## 2021-03-14 LAB
ANION GAP SERPL CALC-SCNC: 7 MMOL/L — SIGNIFICANT CHANGE UP (ref 7–14)
BLD GP AB SCN SERPL QL: SIGNIFICANT CHANGE UP
BUN SERPL-MCNC: 12 MG/DL — SIGNIFICANT CHANGE UP (ref 10–20)
CALCIUM SERPL-MCNC: 9.2 MG/DL — SIGNIFICANT CHANGE UP (ref 8.5–10.1)
CHLORIDE SERPL-SCNC: 102 MMOL/L — SIGNIFICANT CHANGE UP (ref 98–110)
CO2 SERPL-SCNC: 32 MMOL/L — SIGNIFICANT CHANGE UP (ref 17–32)
CREAT SERPL-MCNC: 0.8 MG/DL — SIGNIFICANT CHANGE UP (ref 0.7–1.5)
GLUCOSE SERPL-MCNC: 94 MG/DL — SIGNIFICANT CHANGE UP (ref 70–99)
HCT VFR BLD CALC: 38.3 % — SIGNIFICANT CHANGE UP (ref 37–47)
HGB BLD-MCNC: 13 G/DL — SIGNIFICANT CHANGE UP (ref 12–16)
MCHC RBC-ENTMCNC: 30.4 PG — SIGNIFICANT CHANGE UP (ref 27–31)
MCHC RBC-ENTMCNC: 33.9 G/DL — SIGNIFICANT CHANGE UP (ref 32–37)
MCV RBC AUTO: 89.5 FL — SIGNIFICANT CHANGE UP (ref 81–99)
NRBC # BLD: 0 /100 WBCS — SIGNIFICANT CHANGE UP (ref 0–0)
PLATELET # BLD AUTO: 209 K/UL — SIGNIFICANT CHANGE UP (ref 130–400)
POTASSIUM SERPL-MCNC: 4.4 MMOL/L — SIGNIFICANT CHANGE UP (ref 3.5–5)
POTASSIUM SERPL-SCNC: 4.4 MMOL/L — SIGNIFICANT CHANGE UP (ref 3.5–5)
RBC # BLD: 4.28 M/UL — SIGNIFICANT CHANGE UP (ref 4.2–5.4)
RBC # FLD: 14.1 % — SIGNIFICANT CHANGE UP (ref 11.5–14.5)
SODIUM SERPL-SCNC: 141 MMOL/L — SIGNIFICANT CHANGE UP (ref 135–146)
WBC # BLD: 5.34 K/UL — SIGNIFICANT CHANGE UP (ref 4.8–10.8)
WBC # FLD AUTO: 5.34 K/UL — SIGNIFICANT CHANGE UP (ref 4.8–10.8)

## 2021-03-14 PROCEDURE — 99232 SBSQ HOSP IP/OBS MODERATE 35: CPT

## 2021-03-14 RX ORDER — LISINOPRIL 2.5 MG/1
20 TABLET ORAL DAILY
Refills: 0 | Status: DISCONTINUED | OUTPATIENT
Start: 2021-03-14 | End: 2021-03-15

## 2021-03-14 RX ADMIN — Medication 1 MILLIGRAM(S): at 11:17

## 2021-03-14 RX ADMIN — DONEPEZIL HYDROCHLORIDE 5 MILLIGRAM(S): 10 TABLET, FILM COATED ORAL at 21:47

## 2021-03-14 RX ADMIN — Medication 10 MILLIGRAM(S): at 14:14

## 2021-03-14 RX ADMIN — Medication 10 MILLIGRAM(S): at 00:07

## 2021-03-14 RX ADMIN — SIMVASTATIN 20 MILLIGRAM(S): 20 TABLET, FILM COATED ORAL at 21:47

## 2021-03-14 RX ADMIN — PANTOPRAZOLE SODIUM 40 MILLIGRAM(S): 20 TABLET, DELAYED RELEASE ORAL at 05:25

## 2021-03-14 RX ADMIN — LISINOPRIL 20 MILLIGRAM(S): 2.5 TABLET ORAL at 08:43

## 2021-03-14 RX ADMIN — SODIUM CHLORIDE 3 MILLILITER(S): 9 INJECTION INTRAMUSCULAR; INTRAVENOUS; SUBCUTANEOUS at 05:23

## 2021-03-14 RX ADMIN — CHLORHEXIDINE GLUCONATE 1 APPLICATION(S): 213 SOLUTION TOPICAL at 05:23

## 2021-03-14 RX ADMIN — Medication 81 MILLIGRAM(S): at 11:17

## 2021-03-14 RX ADMIN — SODIUM CHLORIDE 3 MILLILITER(S): 9 INJECTION INTRAMUSCULAR; INTRAVENOUS; SUBCUTANEOUS at 21:47

## 2021-03-14 RX ADMIN — CEFTRIAXONE 100 MILLIGRAM(S): 500 INJECTION, POWDER, FOR SOLUTION INTRAMUSCULAR; INTRAVENOUS at 14:05

## 2021-03-14 RX ADMIN — PANTOPRAZOLE SODIUM 40 MILLIGRAM(S): 20 TABLET, DELAYED RELEASE ORAL at 17:15

## 2021-03-14 RX ADMIN — Medication 4000 MILLILITER(S): at 16:12

## 2021-03-14 RX ADMIN — SODIUM CHLORIDE 3 MILLILITER(S): 9 INJECTION INTRAMUSCULAR; INTRAVENOUS; SUBCUTANEOUS at 14:27

## 2021-03-14 NOTE — CHART NOTE - NSCHARTNOTEFT_GEN_A_CORE
Patient with persistently elevated b/p, received 10mg IVP Hydralazine last night, currently on Metoprolol 25mg.  RN reports b/p 193/84 and HR 54.  Patient asymptomatic.  Will give 20mg Lisinopril now and then QD, monitor b/p and adjust PRN.

## 2021-03-14 NOTE — PROGRESS NOTE ADULT - ASSESSMENT
[id number] pmh HTN, high cholesterol, alzheimer's presents, TIA on aspirin and plavix presents for episodes of rectal bleeding bright red blood since yesterday    CT angio abd/pelvis 3/11  1. No evidence of active intestinal bleed  2. Findings consistent with urinary bladder cystitis. Mild left hydronephrosis with increased urothelial enhancement of left renal pelvis without calculus. Correlate for ascending urinary tract infection, left pyelonephritis.  3. Intrahepatic and extrahepatic biliary dilation, with CBD distended up to 1.1 cm; correlation with LFTs recommended. Consider nonemergent MRCP if clinically warranted.    # Rectal bleeding poss due to  hemorrhoidal, diverticular/AVM- stable now  hemoglobin stable  - colonoscopy planned for monday  -Clear liquids now; NPO after midnight  -PPI BID  -Active Type and Screen  - monitor vitals, CBC  colonoscopy prep ordered by GI     # UTI, with concern for left pyelonephritis  UA- <10,000 normal urogenital kirstin  - c/w Rocephin D4    # HTN ; uncontrolled - possibly from IV fluids  Fluids d/c'ed  - c/w metoprolol 25 mg qd; started on lisinopril on 3/14; if continues to be improving plan to discontinue  - low NA diet     # H/O TIA in 2000  - on asa and plavix as OP  GI cleared to restart aspirin; hold plavix    # HLD  - c/w statin     # alzheimer  - c/w home meds       hold VTE meds , cont b/l SCD  discussed with son. Plan of care updated.   I left mesage with answering service for GI since son wants to talk to GI

## 2021-03-15 VITALS
DIASTOLIC BLOOD PRESSURE: 55 MMHG | SYSTOLIC BLOOD PRESSURE: 113 MMHG | TEMPERATURE: 97 F | RESPIRATION RATE: 16 BRPM | HEART RATE: 66 BPM

## 2021-03-15 LAB
ANION GAP SERPL CALC-SCNC: 8 MMOL/L — SIGNIFICANT CHANGE UP (ref 7–14)
BUN SERPL-MCNC: 13 MG/DL — SIGNIFICANT CHANGE UP (ref 10–20)
CALCIUM SERPL-MCNC: 9.2 MG/DL — SIGNIFICANT CHANGE UP (ref 8.5–10.1)
CHLORIDE SERPL-SCNC: 102 MMOL/L — SIGNIFICANT CHANGE UP (ref 98–110)
CO2 SERPL-SCNC: 29 MMOL/L — SIGNIFICANT CHANGE UP (ref 17–32)
CREAT SERPL-MCNC: 1.1 MG/DL — SIGNIFICANT CHANGE UP (ref 0.7–1.5)
GLUCOSE SERPL-MCNC: 97 MG/DL — SIGNIFICANT CHANGE UP (ref 70–99)
HCT VFR BLD CALC: 38.4 % — SIGNIFICANT CHANGE UP (ref 37–47)
HGB BLD-MCNC: 12.5 G/DL — SIGNIFICANT CHANGE UP (ref 12–16)
MCHC RBC-ENTMCNC: 29.3 PG — SIGNIFICANT CHANGE UP (ref 27–31)
MCHC RBC-ENTMCNC: 32.6 G/DL — SIGNIFICANT CHANGE UP (ref 32–37)
MCV RBC AUTO: 90.1 FL — SIGNIFICANT CHANGE UP (ref 81–99)
NRBC # BLD: 0 /100 WBCS — SIGNIFICANT CHANGE UP (ref 0–0)
PLATELET # BLD AUTO: 229 K/UL — SIGNIFICANT CHANGE UP (ref 130–400)
POTASSIUM SERPL-MCNC: 4.5 MMOL/L — SIGNIFICANT CHANGE UP (ref 3.5–5)
POTASSIUM SERPL-SCNC: 4.5 MMOL/L — SIGNIFICANT CHANGE UP (ref 3.5–5)
RBC # BLD: 4.26 M/UL — SIGNIFICANT CHANGE UP (ref 4.2–5.4)
RBC # FLD: 14.4 % — SIGNIFICANT CHANGE UP (ref 11.5–14.5)
SODIUM SERPL-SCNC: 139 MMOL/L — SIGNIFICANT CHANGE UP (ref 135–146)
WBC # BLD: 5.57 K/UL — SIGNIFICANT CHANGE UP (ref 4.8–10.8)
WBC # FLD AUTO: 5.57 K/UL — SIGNIFICANT CHANGE UP (ref 4.8–10.8)

## 2021-03-15 PROCEDURE — 99238 HOSP IP/OBS DSCHRG MGMT 30/<: CPT

## 2021-03-15 RX ORDER — FAMOTIDINE 10 MG/ML
1 INJECTION INTRAVENOUS
Qty: 0 | Refills: 0 | DISCHARGE

## 2021-03-15 RX ORDER — CEFPODOXIME PROXETIL 100 MG
1 TABLET ORAL
Qty: 10 | Refills: 0
Start: 2021-03-15 | End: 2021-03-19

## 2021-03-15 RX ORDER — PANTOPRAZOLE SODIUM 20 MG/1
1 TABLET, DELAYED RELEASE ORAL
Qty: 28 | Refills: 0
Start: 2021-03-15 | End: 2021-03-28

## 2021-03-15 RX ADMIN — SODIUM CHLORIDE 3 MILLILITER(S): 9 INJECTION INTRAMUSCULAR; INTRAVENOUS; SUBCUTANEOUS at 06:09

## 2021-03-15 RX ADMIN — PANTOPRAZOLE SODIUM 40 MILLIGRAM(S): 20 TABLET, DELAYED RELEASE ORAL at 06:09

## 2021-03-15 RX ADMIN — PANTOPRAZOLE SODIUM 40 MILLIGRAM(S): 20 TABLET, DELAYED RELEASE ORAL at 17:22

## 2021-03-15 RX ADMIN — Medication 81 MILLIGRAM(S): at 11:18

## 2021-03-15 RX ADMIN — LISINOPRIL 20 MILLIGRAM(S): 2.5 TABLET ORAL at 06:09

## 2021-03-15 RX ADMIN — SODIUM CHLORIDE 3 MILLILITER(S): 9 INJECTION INTRAMUSCULAR; INTRAVENOUS; SUBCUTANEOUS at 14:28

## 2021-03-15 RX ADMIN — Medication 1 MILLIGRAM(S): at 11:17

## 2021-03-15 NOTE — DISCHARGE NOTE PROVIDER - PROVIDER TOKENS
PROVIDER:[TOKEN:[[id number]],FOLLOWUP:[2 weeks]],FREE:[LAST:[PMD],PHONE:[(   )    -],FAX:[(   )    -]]

## 2021-03-15 NOTE — DISCHARGE NOTE PROVIDER - HOSPITAL COURSE
85yFemale pmh HTN, high cholesterol, alzheimer's presents, TIA on aspirin and plavix presents for episodes of rectal bleeding bright red blood since yesterday.    CT angio abd/pelvis 3/11:  1. No evidence of active intestinal bleed  2. Findings consistent with urinary bladder cystitis. Mild left hydronephrosis with increased urothelial enhancement of left renal pelvis without calculus. Correlate for ascending urinary tract infection, left pyelonephritis.  3. Intrahepatic and extrahepatic biliary dilation, with CBD distended up to 1.1 cm; correlation with LFTs recommended. Consider nonemergent MRCP if clinically warranted.    # Rectal bleeding poss due to hemorrhoidal, diverticular/AVM- stable now, hemoglobin stable. GI recommending colonoscopy, patient and family refusing at this time. Instructed to follow up outpatient with GI. Continue with PPI twice daily.    # UTI, with concern for left pyelonephritis  UA- <10,000 normal urogenital kirstin  competed 5 day course of rocephin  follow up outpatient with PMD    # HTN ; uncontrolled - possibly from IV fluids  Fluids discontinued  - c/w metoprolol 25 mg qd; started on lisinopril on 3/14; if continues to be improving plan to discontinue  - low NA diet   blood pressure improved, no need for lisinopril    # H/O TIA in 2000  - on asa and plavix as OP  Restarting ASA and Plavix. Follow up PMD    # HLD  - c/w statin     # alzheimer  - c/w home meds 85yFemale pmh HTN, high cholesterol, alzheimer's presents, TIA on aspirin and plavix presents for episodes of rectal bleeding bright red blood   Patient was evaluated with CT angio in ER; result as below  CT angio abd/pelvis 3/11:  1. No evidence of active intestinal bleed  2. Findings consistent with urinary bladder cystitis. Mild left hydronephrosis with increased urothelial enhancement of left renal pelvis without calculus. Correlate for ascending urinary tract infection, left pyelonephritis.  3. Intrahepatic and extrahepatic biliary dilation, with CBD distended up to 1.1 cm; correlation with LFTs recommended. Consider nonemergent MRCP if clinically warranted.    Patient had no further bleeding episode and Hg was stable. GI team was following and patient was monitored.   Plan was made for colonoscopic evaluation after holding plavix for 5 days. But later family declined plans for colonoscopy.  considering clinical stability discharge plan was made.    Patient to follow up Gi physician  Follow up with PCP    Diagnosis    # Rectal bleeding possibly due to hemorrhoidal, diverticular/AVM- stable   hemoglobin stable. GI recommending colonoscopy. Family refusing at this time. Instructed to follow up outpatient with GI.   Continue PPI BID    # UTI, with concern for left pyelonephritis  UA- <10,000 normal urogenital kirstin  patient received 5 days of ceftriaxone. continue cefpodoxime 200 mg BID for 5 more days  follow up outpatient with PMD    # HTN- stable now  - c/w metoprolol 25 mg qd  blood pressure improved, no need for lisinopril    # H/O TIA in 2000  - on asa and plavix as OP  Restarting ASA and Plavix. Follow up PMD    # HLD  - c/w statin     # alzheimer  - c/w home meds     `  PHYSICAL EXAM  GEN: NAD, Resting comfortably in bed  PULM: Clear to auscultation bilaterally, No wheezes  CVS: Regular rate and rhythm, S1-S2, no murmurs  ABD: Soft, non-tender, non-distended, no guarding  EXT: No edema  NEURO: Awake and alert; oriented to self, no focal deficits

## 2021-03-15 NOTE — PROGRESS NOTE ADULT - PROVIDER SPECIALTY LIST ADULT
Gastroenterology
Internal Medicine
Gastroenterology
Gastroenterology
Internal Medicine
Internal Medicine

## 2021-03-15 NOTE — DISCHARGE NOTE PROVIDER - NSDCCPCAREPLAN_GEN_ALL_CORE_FT
PRINCIPAL DISCHARGE DIAGNOSIS  Diagnosis: UTI (urinary tract infection)  Assessment and Plan of Treatment: treated with IV antibiotics  follow up with primary care doctor      SECONDARY DISCHARGE DIAGNOSES  Diagnosis: Abdominal pain  Assessment and Plan of Treatment:     Diagnosis: BRBPR (bright red blood per rectum)  Assessment and Plan of Treatment: GI specialist recommending colonoscopy  please follow up outpatient  please begin taking pantoprazole 40 mg twice daily     PRINCIPAL DISCHARGE DIAGNOSIS  Diagnosis: Bright red rectal bleeding  Assessment and Plan of Treatment: GI specialist recommending colonoscopy  please follow up outpatient gastroenterologist  please begin taking pantoprazole 40 mg twice daily.  Please seek medical care if there any episodes. You had no episoded in the hospital and your hemoglobin was stable  .      SECONDARY DISCHARGE DIAGNOSES  Diagnosis: Stroke  Assessment and Plan of Treatment: From history you previously had stroke and on aspirin and plavix for that. Continue medications. Follow up with your primary care physician.    Diagnosis: Acute UTI  Assessment and Plan of Treatment: You were found to have UTI and your CT scan showed concern for pyelonephritis. (infection to kidney). you were treated with IV antibiotics. You need to continue oral antibiotics for 5 more days  follow up with primary care doctor  .

## 2021-03-15 NOTE — DISCHARGE NOTE PROVIDER - NSDCMRMEDTOKEN_GEN_ALL_CORE_FT
aspirin 81 mg oral tablet, chewable: 1 tab(s) orally once a day  donepezil 5 mg oral tablet: 1 tab(s) orally once a day (at bedtime)  famotidine 20 mg oral tablet: 1 tab(s) orally 2 times a day  folic acid 1 mg oral tablet: 1 tab(s) orally once a day  Lopressor: 25 milligram(s) orally 2 times a day  pantoprazole 40 mg oral delayed release tablet: 1 tab(s) orally 2 times a day  Plavix 75 mg oral tablet: 1 tab(s) orally once a day  simvastatin 20 mg oral tablet: 1 tab(s) orally once a day (at bedtime)   aspirin 81 mg oral tablet, chewable: 1 tab(s) orally once a day  cefpodoxime 200 mg oral tablet: 1 tab(s) orally 2 times a day   donepezil 5 mg oral tablet: 1 tab(s) orally once a day (at bedtime)  folic acid 1 mg oral tablet: 1 tab(s) orally once a day  Lopressor: 25 milligram(s) orally 2 times a day  pantoprazole 40 mg oral delayed release tablet: 1 tab(s) orally 2 times a day  Plavix 75 mg oral tablet: 1 tab(s) orally once a day  simvastatin 20 mg oral tablet: 1 tab(s) orally once a day (at bedtime)

## 2021-03-15 NOTE — PROGRESS NOTE ADULT - ASSESSMENT
85yFemale pmh HTN, high cholesterol, alzheimer's presents, TIA on aspirin and plavix presents for 5 episodes of rectal bleeding bright red blood since yesterday    Problem 1-Rectal bleeding  ddx hemorrhoidal, diverticular, malignancy   patient has never had an EGD/Colonoscopy before   Rec  -CT angio negative for active GI bleeding  -patient and family refusing EGD/Colonoscopy     Problem 2-Intrahepatic and extrahepatic biliary dilation, with CBD distended up to 1.1 cm; correlation with LFTs recommended.   Rec  -LFTs normal, patient without pain  -outpatient MRCP unless LFTs rise     Problem 3- Findings consistent with urinary bladder cystitis. Mild left hydronephrosis with increased urothelial enhancement of left renal pelvis without calculus. Correlate for ascending urinary tract infection, left pyelonephritis.  Rec  - Care as per primary team       Recall GI if needed

## 2021-03-15 NOTE — DISCHARGE NOTE NURSING/CASE MANAGEMENT/SOCIAL WORK - PATIENT PORTAL LINK FT
You can access the FollowMyHealth Patient Portal offered by [facility] by registering at the following website: [email or web address]. By joining [facility]’s FollowMyHealth portal, you will also be able to view your health information using other applications (apps) compatible with our system.

## 2021-03-15 NOTE — DISCHARGE NOTE PROVIDER - CARE PROVIDERS DIRECT ADDRESSES
[email or web address],DirectAddress_Unknown

## 2021-03-15 NOTE — DISCHARGE NOTE PROVIDER - CARE PROVIDER_API CALL
Meng Estrada)  Gastroenterology; Internal Medicine  [address] [place] [address]  Phone: [phone]  Fax: [phone]  Follow Up Time: 2 weeks    PMD,   Phone: (   )    -  Fax: (   )    -  Follow Up Time:

## 2021-03-23 DIAGNOSIS — Z79.02 LONG TERM (CURRENT) USE OF ANTITHROMBOTICS/ANTIPLATELETS: ICD-10-CM

## 2021-03-23 DIAGNOSIS — I10 ESSENTIAL (PRIMARY) HYPERTENSION: ICD-10-CM

## 2021-03-23 DIAGNOSIS — K76.89 OTHER SPECIFIED DISEASES OF LIVER: ICD-10-CM

## 2021-03-23 DIAGNOSIS — E78.00 PURE HYPERCHOLESTEROLEMIA, UNSPECIFIED: ICD-10-CM

## 2021-03-23 DIAGNOSIS — Z53.8 PROCEDURE AND TREATMENT NOT CARRIED OUT FOR OTHER REASONS: ICD-10-CM

## 2021-03-23 DIAGNOSIS — G30.9 ALZHEIMER'S DISEASE, UNSPECIFIED: ICD-10-CM

## 2021-03-23 DIAGNOSIS — Z79.82 LONG TERM (CURRENT) USE OF ASPIRIN: ICD-10-CM

## 2021-03-23 DIAGNOSIS — K62.5 HEMORRHAGE OF ANUS AND RECTUM: ICD-10-CM

## 2021-03-23 DIAGNOSIS — Z88.0 ALLERGY STATUS TO PENICILLIN: ICD-10-CM

## 2021-03-23 DIAGNOSIS — Z79.899 OTHER LONG TERM (CURRENT) DRUG THERAPY: ICD-10-CM

## 2021-03-23 DIAGNOSIS — Z86.73 PERSONAL HISTORY OF TRANSIENT ISCHEMIC ATTACK (TIA), AND CEREBRAL INFARCTION WITHOUT RESIDUAL DEFICITS: ICD-10-CM

## 2021-03-23 DIAGNOSIS — Z79.2 LONG TERM (CURRENT) USE OF ANTIBIOTICS: ICD-10-CM

## 2021-03-23 DIAGNOSIS — N13.6 PYONEPHROSIS: ICD-10-CM

## 2021-03-23 DIAGNOSIS — F02.80 DEMENTIA IN OTHER DISEASES CLASSIFIED ELSEWHERE, UNSPECIFIED SEVERITY, WITHOUT BEHAVIORAL DISTURBANCE, PSYCHOTIC DISTURBANCE, MOOD DISTURBANCE, AND ANXIETY: ICD-10-CM

## 2021-12-14 NOTE — PROGRESS NOTE ADULT - PROVIDER SPECIALTY LIST ADULT
TO: Amanda Arana  [address]  [facility] [address]       December 14, 2021      Dear Karey,    This letter is being provided as a summary of your recent genetic testing results. I have also included a copy of the testing report for your own records.     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 These results were uncertain. 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    The KRT16 gene provides instructions for making a protein called keratin 16 or K16. Keratins are a group of tough, fibrous proteins that form the structural framework of certain cells, particularly cells that make up the skin, hair, and nails. Keratin 16 is produced in the nails, the skin on the palms of the hands and soles of the feet, and the oral mucosa that lines the inside of the mouth. Disease-causing variants in KRT16 is associated with pachyonychia congenita and focal nonepidermolytic palmoplantar keratoderma.     Variant of uncertain significance (VUS) means that a change was identified in the KRT16 gene, but the lab does not have enough information about this particular change to know if it could actually cause palmoplantar keratoderma or if it is just part of normal variation between people. In the future, we may gain additional information about this gene and variant that may help us better understand whether or not this change is contributing to your features.    No other disease-causing or uncertain changes were identified in the 25 genes analyzed. This result rules out many potential genetic causes for your features. However, it is still possible that your features are due to a genetic factor not analyzed by this particular test.     You should continue to follow up with your referring provider as needed. You are encouraged to reach out to us every few years to see if the KRT16 variant has been reclassified  Internal Medicine or if new concerns or questions arise.     Sincerely,    Nia Britton MS, [facility]  Genetic Counselor  [facility]   Phone: [phone]

## 2023-06-15 NOTE — ED ADULT NURSE NOTE - NSFALLRSKHRMRISKTYPE_ED_ALL_ED
other
Bed in lowest position, wheels locked, appropriate side rails in place/Call bell, personal items and telephone in reach/Instruct patient to call for assistance before getting out of bed or chair/Non-slip footwear when patient is out of bed/[place] to call system/Physically safe environment - no spills, clutter or unnecessary equipment/Purposeful Proactive Rounding/Room/bathroom lighting operational, light cord in reach